# Patient Record
Sex: FEMALE | Race: WHITE | Employment: OTHER | URBAN - METROPOLITAN AREA
[De-identification: names, ages, dates, MRNs, and addresses within clinical notes are randomized per-mention and may not be internally consistent; named-entity substitution may affect disease eponyms.]

---

## 2018-10-23 ENCOUNTER — OFFICE VISIT (OUTPATIENT)
Dept: ENDOCRINOLOGY | Age: 53
End: 2018-10-23
Payer: COMMERCIAL

## 2018-10-23 VITALS
HEART RATE: 100 BPM | RESPIRATION RATE: 16 BRPM | DIASTOLIC BLOOD PRESSURE: 68 MMHG | BODY MASS INDEX: 36.51 KG/M2 | HEIGHT: 67 IN | WEIGHT: 232.6 LBS | SYSTOLIC BLOOD PRESSURE: 122 MMHG | OXYGEN SATURATION: 96 %

## 2018-10-23 DIAGNOSIS — G60.9 IDIOPATHIC PERIPHERAL NEUROPATHY: ICD-10-CM

## 2018-10-23 DIAGNOSIS — E55.9 VITAMIN D DEFICIENCY: ICD-10-CM

## 2018-10-23 DIAGNOSIS — E11.8 TYPE 2 DIABETES MELLITUS WITH COMPLICATION, WITHOUT LONG-TERM CURRENT USE OF INSULIN (HCC): Primary | ICD-10-CM

## 2018-10-23 PROBLEM — E53.8 B12 DEFICIENCY: Status: ACTIVE | Noted: 2018-10-23

## 2018-10-23 PROBLEM — G62.9 PERIPHERAL NEUROPATHY: Status: ACTIVE | Noted: 2018-10-23

## 2018-10-23 LAB — HBA1C MFR BLD: 7 %

## 2018-10-23 PROCEDURE — 99204 OFFICE O/P NEW MOD 45 MIN: CPT | Performed by: INTERNAL MEDICINE

## 2018-10-23 PROCEDURE — 83036 HEMOGLOBIN GLYCOSYLATED A1C: CPT | Performed by: INTERNAL MEDICINE

## 2018-10-23 RX ORDER — ALOGLIPTIN 25 MG/1
25 TABLET, FILM COATED ORAL DAILY
COMMUNITY
End: 2021-05-04 | Stop reason: ALTCHOICE

## 2018-10-23 RX ORDER — ALBUTEROL SULFATE 90 UG/1
2 AEROSOL, METERED RESPIRATORY (INHALATION) EVERY 6 HOURS PRN
COMMUNITY

## 2018-10-23 RX ORDER — GABAPENTIN 800 MG/1
800 TABLET ORAL 3 TIMES DAILY
COMMUNITY
End: 2021-04-28 | Stop reason: SDUPTHER

## 2018-10-23 RX ORDER — HYDROXYZINE PAMOATE 25 MG/1
25 CAPSULE ORAL PRN
COMMUNITY
End: 2021-04-22

## 2018-10-23 NOTE — PROGRESS NOTES
ENDOCRINOLOGY CLINIC NOTE    Date of Service: 10/23/2018    Medical Records Reviewed:   Inpatient records, outpatient records, outside records     Care Team:  Primary Care Physician: No primary care provider on file. .  Provider: Eleno Rodríguez MD  Other provider(s):            Reason for the visit:  Type II DM, peripheral neuropathy, vitD deficiency       Type of Visit:  New visit     History of Present Illness: The history is provided by the patient. No  was used. Accuracy of the patient data is excellent. Isabell Velasquez is a very pleasant 48 y.o. female seen in Endocrine clinic today for diabetes management     Isabell Velasquez was diagnosed with diabetes at 6/2017   Currently on Metformin 1000 mg BID, and Alogliptin 25 mg daily   Recently switched from Januvia to Alogliptin because of insurance coverage.   Pt reported diarrhea with Alogliptin and would like to go back to Januvia  She reported no diarrhea with Metformin   The patient rarely checks blood sugar due to cost of strips   Patient denied any major hypoglycemic episodes  The patient has been mindful of what has been eating and following diabetic diet as encouraged  I reviewed current medications and the patient has no issues with diabetes medications  Last eye exam was 9/2017 and denied any h/o diabetic retinopathy   The patient performs her own foot care  Microvascular complications:  No Retinopathy, Nephropathy + Neuropathy   Macrovascular complications: no CAD, PVD, or Stroke  Refuses Flushot      PAST MEDICAL HISTORY   Past Medical History:   Diagnosis Date    Fibromyalgia     Narcotic abuse ([facility] [address]      PAST SURGICAL HISTORY   Past Surgical History:   Procedure Laterality Date    ABDOMEN SURGERY      BLADDER REPAIR      DENTAL SURGERY      MANDIBLE SURGERY       SOCIAL HISTORY   Social History     Social History    Marital status:      Spouse name: N/A    Number of children: N/A    Years of education: N/A OBJECTIVE    /68 (Site: Right Upper Arm, Position: Sitting, Cuff Size: Medium Adult)   Pulse 100   Resp 16   Ht 5' 7\" (1.702 m)   Wt 232 lb 9.6 oz (105.5 kg)   LMP 06/01/2018   SpO2 96%   BMI 36.43 kg/m²   BP Readings from Last 4 Encounters:   10/23/18 122/68   11/11/16 120/84   09/10/16 124/85     Wt Readings from Last 6 Encounters:   10/23/18 232 lb 9.6 oz (105.5 kg)   11/11/16 225 lb (102.1 kg)   09/10/16 208 lb (94.3 kg)       Physical examination:  General: awake alert, oriented x3, no abnormal position or movements. Obese   HEENT: normocephalic non-traumatic, no exophthalmos   Neck: supple, no LN enlargement, no thyromegaly, no thyroid tenderness, no JVD. Pulm: Clear equal air entry no added sounds, no wheezing or rhonchi    CVS: S1 + S2, no murmur, no heave. Dorsalis pedis pulse palpable   Abd: soft lax, no tenderness, no organomegaly, audible bowel sounds. Skin: warm, no lesions, no rash. No callus, no Ulcers, No acanthosis nigricans   Neuro: CN intact, Monofilament sensation slightly decreased bilateral, muscle power normal  Psych: normal mood, and affect      Review of Laboratory Data:  I have reviewed the following:  No results found for: WBC, RBC, HGB, HCT, MCV, MCH, MCHC, RDW, PLT, MPV, GRANULOCYTES, BANDS   No results found for: NA, K, CO2, BUN, CALCIUM, GFR   No results found for: LABA1C, GLUCOSE, MALBCR, LABMICR, LABCREA  No results found for: CHOL, TRIG, HDL, LDLDIRECT  No results found for: VITD25    All labs medical records and images were reviewed independently     Additional Data Reviewed: Individual visualization of point-of-care blood glucose levels and medications doses    ASSESSMENT & RECOMMENDATIONS   Nita Silva, a 48 y.o.-old female seen in for the following issues     Diabetes Mellitus Type 2    · A1c today 7%  · Because of insurance coverage, she was recently switched from Januvia to Alogliptin.  Pt tolerated Metformin and januvia very well but reported back GI

## 2018-10-23 NOTE — LETTER
Last eye exam was 9/2017 and denied any h/o diabetic retinopathy   The patient performs her own foot care  Microvascular complications:  No Retinopathy, Nephropathy + Neuropathy   Macrovascular complications: no CAD, PVD, or Stroke  Refuses Flushot      PAST MEDICAL HISTORY   Past Medical History:   Diagnosis Date    Fibromyalgia     Narcotic abuse ([facility] [address]      PAST SURGICAL HISTORY   Past Surgical History:   Procedure Laterality Date    ABDOMEN SURGERY      BLADDER REPAIR      DENTAL SURGERY      MANDIBLE SURGERY       SOCIAL HISTORY   Social History     Social History    Marital status:      Spouse name: N/A    Number of children: N/A    Years of education: N/A     Occupational History    Not on file. Social History Main Topics    Smoking status: Current Every Day Smoker     Packs/day: 0.50     Types: Cigarettes    Smokeless tobacco: Never Used      Comment: trying to quit    Alcohol use No    Drug use: No    Sexual activity: Not on file     Other Topics Concern    Not on file     Social History Narrative    No narrative on file     FAMILY HISTORY   No family history on file. ALLERGIES AND DRUG REACTIONS   Allergies   Allergen Reactions    Other      Pt admits that she says she may be allergic to erthyromycin-or zithromycin and caused skin reactions     Sulfa Antibiotics Rash     swelling       CURRENT MEDICATIONS     Current Outpatient Prescriptions   Medication Sig Dispense Refill    metFORMIN (GLUCOPHAGE) 1000 MG tablet Take 1,000 mg by mouth 2 times daily (with meals)      gabapentin (NEURONTIN) 800 MG tablet Take 800 mg by mouth 3 times daily. Romero Rivera alogliptin (NESINA) 25 MG TABS tablet Take 25 mg by mouth daily      albuterol sulfate HFA (VENTOLIN HFA) 108 (90 Base) MCG/ACT inhaler Inhale 2 puffs into the lungs every 6 hours as needed for Wheezing      hydrOXYzine (VISTARIL) 25 MG capsule Take 25 mg by mouth as needed for Itching

## 2018-10-24 ENCOUNTER — TELEPHONE (OUTPATIENT)
Dept: ENDOCRINOLOGY | Age: 53
End: 2018-10-24

## 2018-10-24 RX ORDER — SITAGLIPTIN 100 MG/1
100 TABLET, FILM COATED ORAL DAILY
Qty: 30 TABLET | Refills: 5
Start: 2018-10-24

## 2018-10-24 NOTE — TELEPHONE ENCOUNTER
Janumet is not covered by insurance. They are requiring patient to be on a lower cost medication. The 3 they will approve are Nuris Whelan. Please advise. Thank you.

## 2019-03-09 ENCOUNTER — HOSPITAL ENCOUNTER (EMERGENCY)
Age: 54
Discharge: NURSING, REHAB OR LONG-TERM CARE | End: 2019-03-09
Attending: EMERGENCY MEDICINE
Payer: COMMERCIAL

## 2019-03-09 VITALS
TEMPERATURE: 98 F | BODY MASS INDEX: 30.61 KG/M2 | RESPIRATION RATE: 18 BRPM | SYSTOLIC BLOOD PRESSURE: 112 MMHG | WEIGHT: 195 LBS | DIASTOLIC BLOOD PRESSURE: 70 MMHG | HEIGHT: 67 IN | OXYGEN SATURATION: 100 % | HEART RATE: 90 BPM

## 2019-03-09 DIAGNOSIS — F19.10 DRUG ABUSE (HCC): Primary | ICD-10-CM

## 2019-03-09 DIAGNOSIS — M54.31 SCIATICA OF RIGHT SIDE: ICD-10-CM

## 2019-03-09 DIAGNOSIS — F32.A DEPRESSION, UNSPECIFIED DEPRESSION TYPE: ICD-10-CM

## 2019-03-09 LAB
ACETAMINOPHEN LEVEL: <5 MCG/ML (ref 10–30)
ALBUMIN SERPL-MCNC: 3.7 G/DL (ref 3.5–5.2)
ALP BLD-CCNC: 106 U/L (ref 35–104)
ALT SERPL-CCNC: 17 U/L (ref 0–32)
AMPHETAMINE SCREEN, URINE: NOT DETECTED
ANION GAP SERPL CALCULATED.3IONS-SCNC: 11 MMOL/L (ref 7–16)
AST SERPL-CCNC: 14 U/L (ref 0–31)
BARBITURATE SCREEN URINE: NOT DETECTED
BASOPHILS ABSOLUTE: 0.03 E9/L (ref 0–0.2)
BASOPHILS RELATIVE PERCENT: 0.3 % (ref 0–2)
BENZODIAZEPINE SCREEN, URINE: NOT DETECTED
BILIRUB SERPL-MCNC: 0.3 MG/DL (ref 0–1.2)
BUN BLDV-MCNC: 14 MG/DL (ref 6–20)
CALCIUM SERPL-MCNC: 9.8 MG/DL (ref 8.6–10.2)
CANNABINOID SCREEN URINE: NOT DETECTED
CHLORIDE BLD-SCNC: 97 MMOL/L (ref 98–107)
CO2: 26 MMOL/L (ref 22–29)
COCAINE METABOLITE SCREEN URINE: POSITIVE
CREAT SERPL-MCNC: 0.8 MG/DL (ref 0.5–1)
EOSINOPHILS ABSOLUTE: 0.08 E9/L (ref 0.05–0.5)
EOSINOPHILS RELATIVE PERCENT: 0.7 % (ref 0–6)
ETHANOL: <10 MG/DL (ref 0–0.08)
GFR AFRICAN AMERICAN: >60
GFR NON-AFRICAN AMERICAN: >60 ML/MIN/1.73
GLUCOSE BLD-MCNC: 129 MG/DL (ref 74–99)
HCT VFR BLD CALC: 38.5 % (ref 34–48)
HEMOGLOBIN: 12.4 G/DL (ref 11.5–15.5)
IMMATURE GRANULOCYTES #: 0.04 E9/L
IMMATURE GRANULOCYTES %: 0.4 % (ref 0–5)
LYMPHOCYTES ABSOLUTE: 1.28 E9/L (ref 1.5–4)
LYMPHOCYTES RELATIVE PERCENT: 11.5 % (ref 20–42)
MCH RBC QN AUTO: 27.5 PG (ref 26–35)
MCHC RBC AUTO-ENTMCNC: 32.2 % (ref 32–34.5)
MCV RBC AUTO: 85.4 FL (ref 80–99.9)
METHADONE SCREEN, URINE: NOT DETECTED
MONOCYTES ABSOLUTE: 0.99 E9/L (ref 0.1–0.95)
MONOCYTES RELATIVE PERCENT: 8.9 % (ref 2–12)
NEUTROPHILS ABSOLUTE: 8.73 E9/L (ref 1.8–7.3)
NEUTROPHILS RELATIVE PERCENT: 78.2 % (ref 43–80)
OPIATE SCREEN URINE: POSITIVE
PDW BLD-RTO: 12.2 FL (ref 11.5–15)
PHENCYCLIDINE SCREEN URINE: NOT DETECTED
PLATELET # BLD: 520 E9/L (ref 130–450)
PMV BLD AUTO: 9.5 FL (ref 7–12)
POTASSIUM SERPL-SCNC: 3.9 MMOL/L (ref 3.5–5)
PROPOXYPHENE SCREEN: NOT DETECTED
RBC # BLD: 4.51 E12/L (ref 3.5–5.5)
SALICYLATE, SERUM: <0.3 MG/DL (ref 0–30)
SODIUM BLD-SCNC: 134 MMOL/L (ref 132–146)
TOTAL PROTEIN: 8.2 G/DL (ref 6.4–8.3)
TROPONIN: <0.01 NG/ML (ref 0–0.03)
WBC # BLD: 11.2 E9/L (ref 4.5–11.5)

## 2019-03-09 PROCEDURE — 80307 DRUG TEST PRSMV CHEM ANLYZR: CPT

## 2019-03-09 PROCEDURE — 80053 COMPREHEN METABOLIC PANEL: CPT

## 2019-03-09 PROCEDURE — G0480 DRUG TEST DEF 1-7 CLASSES: HCPCS

## 2019-03-09 PROCEDURE — 6370000000 HC RX 637 (ALT 250 FOR IP): Performed by: EMERGENCY MEDICINE

## 2019-03-09 PROCEDURE — 6360000002 HC RX W HCPCS: Performed by: EMERGENCY MEDICINE

## 2019-03-09 PROCEDURE — 96372 THER/PROPH/DIAG INJ SC/IM: CPT

## 2019-03-09 PROCEDURE — 85025 COMPLETE CBC W/AUTO DIFF WBC: CPT

## 2019-03-09 PROCEDURE — 99285 EMERGENCY DEPT VISIT HI MDM: CPT

## 2019-03-09 PROCEDURE — 93005 ELECTROCARDIOGRAM TRACING: CPT | Performed by: EMERGENCY MEDICINE

## 2019-03-09 PROCEDURE — 2580000003 HC RX 258: Performed by: EMERGENCY MEDICINE

## 2019-03-09 PROCEDURE — 84484 ASSAY OF TROPONIN QUANT: CPT

## 2019-03-09 PROCEDURE — 36415 COLL VENOUS BLD VENIPUNCTURE: CPT

## 2019-03-09 RX ORDER — HYDROCODONE BITARTRATE AND ACETAMINOPHEN 5; 325 MG/1; MG/1
1 TABLET ORAL ONCE
Status: COMPLETED | OUTPATIENT
Start: 2019-03-09 | End: 2019-03-09

## 2019-03-09 RX ORDER — KETOROLAC TROMETHAMINE 30 MG/ML
60 INJECTION, SOLUTION INTRAMUSCULAR; INTRAVENOUS ONCE
Status: COMPLETED | OUTPATIENT
Start: 2019-03-09 | End: 2019-03-09

## 2019-03-09 RX ORDER — 0.9 % SODIUM CHLORIDE 0.9 %
1000 INTRAVENOUS SOLUTION INTRAVENOUS ONCE
Status: COMPLETED | OUTPATIENT
Start: 2019-03-09 | End: 2019-03-09

## 2019-03-09 RX ADMIN — HYDROCODONE BITARTRATE AND ACETAMINOPHEN 1 TABLET: 5; 325 TABLET ORAL at 18:35

## 2019-03-09 RX ADMIN — KETOROLAC TROMETHAMINE 60 MG: 30 INJECTION, SOLUTION INTRAMUSCULAR at 14:18

## 2019-03-09 RX ADMIN — SODIUM CHLORIDE 1000 ML: 9 INJECTION, SOLUTION INTRAVENOUS at 14:19

## 2019-03-09 ASSESSMENT — PAIN SCALES - GENERAL
PAINLEVEL_OUTOF10: 10
PAINLEVEL_OUTOF10: 3
PAINLEVEL_OUTOF10: 6
PAINLEVEL_OUTOF10: 10

## 2019-03-09 ASSESSMENT — ENCOUNTER SYMPTOMS
SINUS PRESSURE: 0
SHORTNESS OF BREATH: 0
BACK PAIN: 1
WHEEZING: 0
VOMITING: 0
SORE THROAT: 0
ABDOMINAL PAIN: 0
DIARRHEA: 0
COUGH: 0
CHEST TIGHTNESS: 0
ABDOMINAL DISTENTION: 0
NAUSEA: 0

## 2019-03-10 LAB
EKG ATRIAL RATE: 102 BPM
EKG P AXIS: 74 DEGREES
EKG P-R INTERVAL: 128 MS
EKG Q-T INTERVAL: 326 MS
EKG QRS DURATION: 76 MS
EKG QTC CALCULATION (BAZETT): 424 MS
EKG R AXIS: 40 DEGREES
EKG T AXIS: 45 DEGREES
EKG VENTRICULAR RATE: 102 BPM

## 2019-03-15 LAB — COCAINE, CONFIRM, URINE: >1000 NG/ML

## 2019-03-20 LAB
6AM URINE: <10 NG/ML
CODEINE, URINE: <20 NG/ML
HYDROCODONE, URINE: <20 NG/ML
HYDROMORPHONE, URINE: <20 NG/ML
MORPHINE URINE: 48 NG/ML
NORHYDROCODONE, URINE: <20 NG/ML
NOROXYCODONE, URINE: <20 NG/ML
NOROXYMORPHONE, URINE: <20 NG/ML
OXYCODONE, URINE CONFIRMATION: <20 NG/ML
OXYMORPHONE, URINE: <20 NG/ML

## 2019-05-10 ENCOUNTER — TELEPHONE (OUTPATIENT)
Dept: ENDOCRINOLOGY | Age: 54
End: 2019-05-10

## 2019-05-10 NOTE — TELEPHONE ENCOUNTER
We received a fax from [employer] that janumet xr 50-1000mg tablet needs prior auth. That is in process in cover my meds.

## 2020-01-30 ENCOUNTER — HOSPITAL ENCOUNTER (EMERGENCY)
Age: 55
Discharge: HOME | End: 2020-01-30
Attending: EMERGENCY MEDICINE
Payer: COMMERCIAL

## 2020-01-30 VITALS
BODY MASS INDEX: 25.9 KG/M2 | TEMPERATURE: 98.4 F | RESPIRATION RATE: 18 BRPM | HEART RATE: 88 BPM | OXYGEN SATURATION: 97 % | WEIGHT: 165 LBS | DIASTOLIC BLOOD PRESSURE: 66 MMHG | SYSTOLIC BLOOD PRESSURE: 96 MMHG | HEIGHT: 67 IN

## 2020-01-30 LAB
INFLUENZA A BY PCR: NOT DETECTED
INFLUENZA B BY PCR: NOT DETECTED

## 2020-01-30 PROCEDURE — 99284 EMERGENCY DEPT VISIT MOD MDM: CPT

## 2020-01-30 PROCEDURE — 6370000000 HC RX 637 (ALT 250 FOR IP): Performed by: EMERGENCY MEDICINE

## 2020-01-30 PROCEDURE — 87502 INFLUENZA DNA AMP PROBE: CPT

## 2020-01-30 RX ORDER — IBUPROFEN 800 MG/1
800 TABLET ORAL ONCE
Status: COMPLETED | OUTPATIENT
Start: 2020-01-30 | End: 2020-01-30

## 2020-01-30 RX ORDER — OSELTAMIVIR PHOSPHATE 75 MG/1
75 CAPSULE ORAL ONCE
Status: COMPLETED | OUTPATIENT
Start: 2020-01-30 | End: 2020-01-30

## 2020-01-30 RX ORDER — OSELTAMIVIR PHOSPHATE 75 MG/1
75 CAPSULE ORAL 2 TIMES DAILY
Qty: 10 CAPSULE | Refills: 0 | Status: SHIPPED | OUTPATIENT
Start: 2020-01-30 | End: 2020-02-04

## 2020-01-30 RX ADMIN — OSELTAMIVIR PHOSPHATE 75 MG: 75 CAPSULE ORAL at 06:35

## 2020-01-30 RX ADMIN — IBUPROFEN 800 MG: 800 TABLET, FILM COATED ORAL at 05:41

## 2020-01-30 ASSESSMENT — ENCOUNTER SYMPTOMS
EYE DISCHARGE: 0
SHORTNESS OF BREATH: 0
COUGH: 0
VOMITING: 0
NAUSEA: 0
SINUS PRESSURE: 0
WHEEZING: 0
ABDOMINAL DISTENTION: 0
DIARRHEA: 0
EYE PAIN: 0
BACK PAIN: 0
EYE REDNESS: 0
SORE THROAT: 0

## 2020-01-30 ASSESSMENT — PAIN DESCRIPTION - FREQUENCY: FREQUENCY: CONTINUOUS

## 2020-01-30 ASSESSMENT — PAIN DESCRIPTION - ORIENTATION: ORIENTATION: RIGHT;LEFT

## 2020-01-30 ASSESSMENT — PAIN SCALES - GENERAL
PAINLEVEL_OUTOF10: 5
PAINLEVEL_OUTOF10: 5

## 2020-01-30 ASSESSMENT — PAIN DESCRIPTION - PAIN TYPE: TYPE: ACUTE PAIN

## 2020-01-30 ASSESSMENT — PAIN DESCRIPTION - LOCATION: LOCATION: HEAD;NECK;KNEE

## 2020-01-30 ASSESSMENT — PAIN DESCRIPTION - DESCRIPTORS: DESCRIPTORS: ACHING

## 2020-01-30 NOTE — ED PROVIDER NOTES
sounds: Normal breath sounds. No stridor. No wheezing, rhonchi or rales. Abdominal:      General: Bowel sounds are normal. There is no distension. Palpations: Abdomen is soft. Tenderness: There is no abdominal tenderness. There is no guarding. Musculoskeletal: Normal range of motion. General: No swelling. Skin:     General: Skin is warm and dry. Findings: No rash. Neurological:      Mental Status: She is alert and oriented to person, place, and time. Procedures     [facility]              --------------------------------------------- PAST HISTORY ---------------------------------------------  Past Medical History:  has a past medical history of COPD (chronic obstructive pulmonary disease) ([facility] Fibromyalgia, Narcotic abuse ([facility] and Type 2 diabetes mellitus with complication, without long-term current use of insulin ([facility] Past Surgical History:  has a past surgical history that includes Mandible surgery; bladder repair; Abdomen surgery; and Dental surgery. Social History:  reports that she has been smoking cigarettes. She has been smoking about 0.50 packs per day. She has never used smokeless tobacco. She reports current drug use. Drug: IV. She reports that she does not drink alcohol. Family History: family history includes Diabetes in her brother, father, maternal uncle, paternal uncle, and another family member; Heart Disease in her maternal uncle and mother. The patients home medications have been reviewed. Allergies:  Other and Sulfa antibiotics    -------------------------------------------------- RESULTS -------------------------------------------------  Labs:  Results for orders placed or performed during the hospital encounter of 01/30/20   Rapid influenza A/B antigens   Result Value Ref Range    Influenza A by PCR Not Detected Not Detected    Influenza B by PCR Not Detected Not Detected       Radiology:  No orders to display       ------------------------- NURSING NOTES AND VITALS REVIEWED ---------------------------  Date / Time Roomed:  1/30/2020  5:01 AM  ED Bed Assignment:  08/08    The nursing notes within the ED encounter and vital signs as below have been reviewed. BP 96/66   Pulse 88   Temp 98.4 °F (36.9 °C) (Oral)   Resp 18   Ht 5' 7\" (1.702 m)   Wt 165 lb (74.8 kg)   LMP 06/01/2018   SpO2 97%   BMI 25.84 kg/m²   Oxygen Saturation Interpretation: Normal      ------------------------------------------ PROGRESS NOTES ------------------------------------------  I have spoken with the patient and discussed todays results, in addition to providing specific details for the plan of care and counseling regarding the diagnosis and prognosis. Their questions are answered at this time and they are agreeable with the plan. I discussed at length with them reasons for immediate return here for re evaluation. They will followup with primary care by calling their office tomorrow. --------------------------------- ADDITIONAL PROVIDER NOTES ---------------------------------  At this time the patient is without objective evidence of an acute process requiring hospitalization or inpatient management. They have remained hemodynamically stable throughout their entire ED visit and are stable for discharge with outpatient follow-up. The plan has been discussed in detail and they are aware of the specific conditions for emergent return, as well as the importance of follow-up. New Prescriptions    OSELTAMIVIR (TAMIFLU) 75 MG CAPSULE    Take 1 capsule by mouth 2 times daily for 5 days       Diagnosis:  1. Exposure to influenza        Disposition:  Patient's disposition: Discharge to home  Patient's condition is stable.              [facility] DO Kati  01/30/20 [id number]

## 2020-11-02 LAB
AVERAGE GLUCOSE: NORMAL
HBA1C MFR BLD: 6 %

## 2021-03-10 ENCOUNTER — OFFICE VISIT (OUTPATIENT)
Dept: ONCOLOGY | Age: 56
End: 2021-03-10
Payer: COMMERCIAL

## 2021-03-10 ENCOUNTER — HOSPITAL ENCOUNTER (OUTPATIENT)
Dept: INFUSION THERAPY | Age: 56
Discharge: HOME | End: 2021-03-10
Payer: COMMERCIAL

## 2021-03-10 VITALS
DIASTOLIC BLOOD PRESSURE: 68 MMHG | BODY MASS INDEX: 28.41 KG/M2 | SYSTOLIC BLOOD PRESSURE: 116 MMHG | TEMPERATURE: 98 F | WEIGHT: 181 LBS | RESPIRATION RATE: 16 BRPM | HEIGHT: 67 IN | HEART RATE: 86 BPM

## 2021-03-10 DIAGNOSIS — R59.1 LYMPHADENOPATHY: Primary | ICD-10-CM

## 2021-03-10 PROCEDURE — 99205 OFFICE O/P NEW HI 60 MIN: CPT | Performed by: INTERNAL MEDICINE

## 2021-03-10 PROCEDURE — 99214 OFFICE O/P EST MOD 30 MIN: CPT

## 2021-03-10 PROCEDURE — 3017F COLORECTAL CA SCREEN DOC REV: CPT | Performed by: INTERNAL MEDICINE

## 2021-03-10 PROCEDURE — G8484 FLU IMMUNIZE NO ADMIN: HCPCS | Performed by: INTERNAL MEDICINE

## 2021-03-10 PROCEDURE — 4004F PT TOBACCO SCREEN RCVD TLK: CPT | Performed by: INTERNAL MEDICINE

## 2021-03-10 PROCEDURE — G8427 DOCREV CUR MEDS BY ELIG CLIN: HCPCS | Performed by: INTERNAL MEDICINE

## 2021-03-10 PROCEDURE — G8419 CALC BMI OUT NRM PARAM NOF/U: HCPCS | Performed by: INTERNAL MEDICINE

## 2021-03-10 RX ORDER — CLONIDINE HYDROCHLORIDE 0.1 MG/1
0.1 TABLET ORAL DAILY
COMMUNITY
End: 2021-05-04 | Stop reason: ALTCHOICE

## 2021-03-10 RX ORDER — TRAZODONE HYDROCHLORIDE 150 MG/1
200 TABLET ORAL NIGHTLY
COMMUNITY

## 2021-03-10 NOTE — PROGRESS NOTES
Claudia Conway Christofer  1965 49 y.o. Referring Physician:     PCP: Celia Evans MD    Vitals:    03/10/21 1502   BP: 116/68   Pulse: 86   Resp: 16   Temp: 98 °F (36.7 °C)        Wt Readings from Last 3 Encounters:   03/10/21 181 lb (82.1 kg)   20 165 lb (74.8 kg)   19 195 lb (88.5 kg)        Body mass index is 28.35 kg/m². Chief Complaint: No chief complaint on file. Cancer Staging  No matching staging information was found for the patient. Prior Radiation Therapy? NO    Concurrent Chemo/radiation? NO    Prior Chemotherapy? NO    Prior Hormonal Therapy? NO    Head and Neck Cancer? No, patient does NOT have HN cancer. LMP: na    Age at first Menses: 17    : 3    Para: 3          Current Outpatient Medications:     cloNIDine (CATAPRES) 0.1 MG tablet, Take 0.1 mg by mouth daily, Disp: , Rfl:     traZODone (DESYREL) 150 MG tablet, Take 150 mg by mouth nightly, Disp: , Rfl:     gabapentin (NEURONTIN) 800 MG tablet, Take 800 mg by mouth 3 times daily. ., Disp: , Rfl:     albuterol sulfate HFA (VENTOLIN HFA) 108 (90 Base) MCG/ACT inhaler, Inhale 2 puffs into the lungs every 6 hours as needed for Wheezing, Disp: , Rfl:     SITagliptin-metFORMIN (JANUMET XR)  MG TB24 per extended release tablet, Take 1 tablet twice a day, Disp: 60 tablet, Rfl: 5    JANUVIA 100 MG tablet, Take 1 tablet by mouth daily (Patient not taking: Reported on 3/10/2021), Disp: 30 tablet, Rfl: 5    metFORMIN (GLUCOPHAGE) 1000 MG tablet, Take 1,000 mg by mouth 2 times daily (with meals), Disp: , Rfl:     alogliptin (NESINA) 25 MG TABS tablet, Take 25 mg by mouth daily, Disp: , Rfl:     hydrOXYzine (VISTARIL) 25 MG capsule, Take 25 mg by mouth as needed for Itching, Disp: , Rfl:        Past Medical History:   Diagnosis Date    COPD (chronic obstructive pulmonary disease) (HCC)     Fibromyalgia     Narcotic abuse (HCC)     Type 2 diabetes mellitus with complication, without long-term current use of insulin ([facility] [address] 10/23/2018       Past Surgical History:   Procedure Laterality Date    ABDOMEN SURGERY      BLADDER REPAIR      DENTAL SURGERY      MANDIBLE SURGERY         Family History   Problem Relation Age of Onset    Heart Disease Mother     Diabetes Father     Diabetes Brother     Diabetes Other     Diabetes Maternal Uncle     Heart Disease Maternal Uncle     Diabetes Paternal Uncle        Social History     Socioeconomic History    Marital status:      Spouse name: Not on file    Number of children: Not on file    Years of education: Not on file    Highest education level: Not on file   Occupational History    Not on file   Social Needs    Financial resource strain: Not on file    Food insecurity     Worry: Not on file     Inability: Not on file    Transportation needs     Medical: Not on file     Non-medical: Not on file   Tobacco Use    Smoking status: Current Every Day Smoker     Packs/day: 0.50     Types: Cigarettes    Smokeless tobacco: Never Used    Tobacco comment: trying to quit   Substance and Sexual Activity    Alcohol use: No    Drug use: Yes     Types: IV     Comment: heroin and crack    Sexual activity: Not on file   Lifestyle    Physical activity     Days per week: Not on file     Minutes per session: Not on file    Stress: Not on file   Relationships    Social connections     Talks on phone: Not on file     Gets together: Not on file     Attends Confucianist service: Not on file     Active member of club or organization: Not on file     Attends meetings of clubs or organizations: Not on file     Relationship status: Not on file    Intimate partner violence     Fear of current or ex partner: Not on file     Emotionally abused: Not on file     Physically abused: Not on file     Forced sexual activity: Not on file   Other Topics Concern    Not on file   Social History Narrative    Not on file           Occupation: housewife  Retired:  NO REVIEW OF SYSTEMS:      Pacemaker/Defibulator/ICD:  No    Mediport: No           FALLS RISK SCREENING ASSESSMENT    Instructions:  Assess the patient and [place] the appropriate indicators that are present for fall risk identification. Total the numbers circled and assign a fall risk score from Table 2.  Reassess patient at a minimum every 12 weeks or with status change. Assessment   Date  3/10/2021     1. Mental Ability: confusion/cognitively impaired Yes - 3, can't remember anything, can't recall names sometimes       2. Elimination Issues: incontinence, frequency No - 0, has bladder sling       3. Ambulatory: use of assistive devices (walker, cane, off-loading devices), attached to equipment (IV pole, oxygen) No - 0     4. Sensory Limitations: dizziness, vertigo, impaired vision No - 0       5. Age Less than 65 years - 0       6. Medication: diuretics, strong analgesics, hypnotics, sedatives, antihypertensive agents   Yes - 3   7. Falls:  recent history of falls within the last 3 months (not to include slipping or tripping)   No - 0   TOTAL 6    If score of 4 or greater was education given? No       TABLE 2   Risk Score Risk Level Plan of Care   0-3 Little or  No Risk 1. Provide assistance as indicated for ambulation activities  2. Reorient confused/cognitively impaired patient  3. Call-light/bell within patient's reach  4. Chair/bed in low position, stretcher/bed with siderails up except when performing patient care activities  5. Educate patient/family/caregiver on falls prevention  6.  Reassess in 12 weeks or with any noted change in patient condition which places them at a risk for a fall   4-6 Moderate Risk 1. Provide assistance as indicated for ambulation activities  2. Reorient confused/cognitively impaired patient  3. Call-light/bell within patient's reach  4. Chair/bed in low position, stretcher/bed with siderails up except when performing patient care activities  5.   Educate

## 2021-03-10 NOTE — PROGRESS NOTES
[address] [facility] [address]  Dept: [phone]  Loc: [phone]  Attending Consult Note      Reason for Visit:   Lymphadenopathy. Referring Physician: Nishant Mendez DO    PCP:  Veronica Peter MD    History of Present Illness: The patient is a pleasant 79-year-old lady, with a past medical history significant for tobacco use disorder, COPD, fibromyalgia, type 2 diabetes mellitus, peripheral neuropathy, asthma, anxiety and depression, who was referred to the hematology office for evaluation of lymphadenopathy. The patient had noticed enlarged lymph nodes in the neck, axilla and groin about 6 to 7 months ago, the lymph node in the left axilla had significantly increased in size, then the size decreased again, she has been feeling tired, no fever, she has night sweats. She had lost weight previously,she is eating more to put the weight back on. She has early satiety. Review of Systems;  CONSTITUTIONAL: No fever, chills. Good appetite, feeling tired. ENMT: Eyes: No diplopia; Nose: No epistaxis. Mouth: No sore throat. RESPIRATORY: No hemoptysis, positive for chronic shortness of breath, cough. CARDIOVASCULAR: No chest pain, palpitations. GASTROINTESTINAL: No nausea/vomiting, she has abdominal tenderness, no diarrhea/constipation. GENITOURINARY: No dysuria, urinary frequency, hematuria. NEURO: No syncope, presyncope, headache.   Remainder:  ROS NEGATIVE    Past Medical History:      Diagnosis Date    COPD (chronic obstructive pulmonary disease) ([address]     Fibromyalgia     Narcotic abuse ([address]     Type 2 diabetes mellitus with complication, without long-term current use of insulin ([address] 10/23/2018     Patient Active Problem List   Diagnosis    Type 2 diabetes mellitus with complication, without long-term current use of insulin ([address]    Vitamin D deficiency    B12 deficiency    Peripheral neuropathy        Past Surgical History: Procedure Laterality Date    ABDOMEN SURGERY      BLADDER REPAIR      DENTAL SURGERY      JOINT REPLACEMENT      tmj surgery    MANDIBLE SURGERY         Family History:  Family History   Problem Relation Age of Onset    Heart Disease Mother     Diabetes Father     Diabetes Brother     Diabetes Other     Diabetes Maternal Uncle     Heart Disease Maternal Uncle     Diabetes Paternal Uncle        Medications:  Reviewed and reconciled.     Social History:  Social History     Socioeconomic History    Marital status:      Spouse name: Not on file    Number of children: Not on file    Years of education: Not on file    Highest education level: Not on file   Occupational History    Not on file   Social Needs    Financial resource strain: Not on file    Food insecurity     Worry: Not on file     Inability: Not on file    Transportation needs     Medical: Not on file     Non-medical: Not on file   Tobacco Use    Smoking status: Current Every Day Smoker     Packs/day: 0.50     Types: Cigarettes    Smokeless tobacco: Never Used    Tobacco comment: trying to quit   Substance and Sexual Activity    Alcohol use: No    Drug use: Yes     Types: IV     Comment: heroin and crack    Sexual activity: Not on file   Lifestyle    Physical activity     Days per week: Not on file     Minutes per session: Not on file    Stress: Not on file   Relationships    Social connections     Talks on phone: Not on file     Gets together: Not on file     Attends Hinduism service: Not on file     Active member of club or organization: Not on file     Attends meetings of clubs or organizations: Not on file     Relationship status: Not on file    Intimate partner violence     Fear of current or ex partner: Not on file     Emotionally abused: Not on file     Physically abused: Not on file     Forced sexual activity: Not on file   Other Topics Concern    Not on file   Social History Narrative    Not on file Allergies: Allergies   Allergen Reactions    Other      Pt admits that she says she may be allergic to erthyromycin-or zithromycin and caused skin reactions     Sulfa Antibiotics Rash     swelling       Physical Exam:  /68 (Site: Right Upper Arm, Position: Sitting, Cuff Size: Large Adult)   Pulse 86   Temp 98 °F (36.7 °C) (Temporal)   Resp 16   Ht 5' 7\" (1.702 m)   Wt 181 lb (82.1 kg)   LMP 06/01/2018   BMI 28.35 kg/m²   GENERAL: Alert, oriented x 3, not in acute distress. HEENT: PERRLA; EOMI. Oropharynx clear. NECK: Supple. Palpable right-sided neck lymphadenopathy. LUNGS: Good air entry bilaterally. No wheezing, crackles or rhonchi. CARDIOVASCULAR: Regular rate. No murmurs, rubs or gallops. ABDOMEN: Soft. Non-tender, non-distended. Positive bowel sounds. EXTREMITIES: Without clubbing, cyanosis, or edema. NEUROLOGIC: No focal deficits. LYMPHATICS: Palpable axillary and inguinal adenopathy. ECOG PS 0    Impression/Plan:      The patient is a pleasant 57-year-old lady, with a past medical history significant for COPD, fibromyalgia, type 2 diabetes mellitus, peripheral neuropathy, asthma, anxiety and depression, who was referred to the hematology office for evaluation of lymphadenopathy. The patient had noticed enlarged lymph nodes in the neck, axilla and groin about 6 to 7 months ago, the lymph node in the left axilla had significantly increased in size, then the size decreased again, she has been feeling tired, no fever, she has night sweats. She had lost weight previously,she is eating more to put the weight back on. She has early satiety. The patient has generalized lymphadenopathy, along with her symptoms this is concerning for a lymphoproliferative disorder, we discussed other possibilities, such as metastatic disease to the lymph nodes, and inflammatory etiology. She will have CBC CMP, LDH, TIGRE, uric acid, ESR, HIV and hepatitis testing done.  CT scans of the neck, chest abdomen pelvis were ordered for further evaluation. RTC in 1-2 weeks. Thank you for allowing us to participate in the care of Mrs. Jerry Chen.     Loren Story MD   HEMATOLOGY/MEDICAL [facility]  [address] [address]  [facility]pt: [facility]: [phone]

## 2021-03-12 ENCOUNTER — TELEPHONE (OUTPATIENT)
Dept: INFUSION THERAPY | Age: 56
End: 2021-03-12

## 2021-03-12 NOTE — TELEPHONE ENCOUNTER
Returned patient's phone call. Patient states she \"has a long list of things that are wrong\" and that she just does not \"feel right. \" Patient c/o jaw tightness, white spots when looking at her phone, headache (which is causing her to [facility] sideways\"), dizziness, no memory recall, trouble with urination. She also states she \"has pain all over. \" Muscles and joints \"feel tight\". Said RN will update Dr. Tom Morfin. Advised patient if she feels that bad or symptoms get worse she needs to go to the ER to be evaluated. Patient verbalized understanding.   Antoinette Coy RN 3/12/2021 [id number]

## 2021-04-02 ENCOUNTER — TELEPHONE (OUTPATIENT)
Dept: NEUROLOGY | Age: 56
End: 2021-04-02

## 2021-04-13 ENCOUNTER — HOSPITAL ENCOUNTER (OUTPATIENT)
Dept: CT IMAGING | Age: 56
Discharge: HOME | End: 2021-04-13
Payer: COMMERCIAL

## 2021-04-13 DIAGNOSIS — R59.1 LYMPHADENOPATHY: ICD-10-CM

## 2021-04-13 PROCEDURE — 70491 CT SOFT TISSUE NECK W/DYE: CPT

## 2021-04-13 PROCEDURE — 71260 CT THORAX DX C+: CPT

## 2021-04-13 PROCEDURE — 6360000004 HC RX CONTRAST MEDICATION: Performed by: RADIOLOGY

## 2021-04-13 PROCEDURE — 74177 CT ABD & PELVIS W/CONTRAST: CPT

## 2021-04-13 RX ADMIN — IOPAMIDOL 75 ML: 755 INJECTION, SOLUTION INTRAVENOUS at 17:03

## 2021-04-13 RX ADMIN — IOHEXOL 50 ML: 240 INJECTION, SOLUTION INTRATHECAL; INTRAVASCULAR; INTRAVENOUS; ORAL at 17:03

## 2021-04-15 ENCOUNTER — HOSPITAL ENCOUNTER (OUTPATIENT)
Dept: INFUSION THERAPY | Age: 56
Discharge: HOME | End: 2021-04-15
Payer: COMMERCIAL

## 2021-04-15 ENCOUNTER — OFFICE VISIT (OUTPATIENT)
Dept: ONCOLOGY | Age: 56
End: 2021-04-15
Payer: COMMERCIAL

## 2021-04-15 VITALS
TEMPERATURE: 97.5 F | RESPIRATION RATE: 18 BRPM | HEIGHT: 67 IN | DIASTOLIC BLOOD PRESSURE: 76 MMHG | OXYGEN SATURATION: 96 % | HEART RATE: 74 BPM | BODY MASS INDEX: 27.44 KG/M2 | SYSTOLIC BLOOD PRESSURE: 112 MMHG | WEIGHT: 174.8 LBS

## 2021-04-15 DIAGNOSIS — R76.8 HEPATITIS C ANTIBODY POSITIVE IN BLOOD: ICD-10-CM

## 2021-04-15 DIAGNOSIS — R59.1 LYMPHADENOPATHY: Primary | ICD-10-CM

## 2021-04-15 DIAGNOSIS — R59.1 LYMPHADENOPATHY: ICD-10-CM

## 2021-04-15 LAB — LACTATE DEHYDROGENASE: 233 U/L (ref 135–214)

## 2021-04-15 PROCEDURE — 99212 OFFICE O/P EST SF 10 MIN: CPT

## 2021-04-15 PROCEDURE — 36415 COLL VENOUS BLD VENIPUNCTURE: CPT

## 2021-04-15 PROCEDURE — 87522 HEPATITIS C REVRS TRNSCRPJ: CPT

## 2021-04-15 PROCEDURE — G8419 CALC BMI OUT NRM PARAM NOF/U: HCPCS | Performed by: INTERNAL MEDICINE

## 2021-04-15 PROCEDURE — 99214 OFFICE O/P EST MOD 30 MIN: CPT | Performed by: INTERNAL MEDICINE

## 2021-04-15 PROCEDURE — 3017F COLORECTAL CA SCREEN DOC REV: CPT | Performed by: INTERNAL MEDICINE

## 2021-04-15 PROCEDURE — 88185 FLOWCYTOMETRY/TC ADD-ON: CPT

## 2021-04-15 PROCEDURE — 88184 FLOWCYTOMETRY/ TC 1 MARKER: CPT

## 2021-04-15 PROCEDURE — 4004F PT TOBACCO SCREEN RCVD TLK: CPT | Performed by: INTERNAL MEDICINE

## 2021-04-15 PROCEDURE — 83615 LACTATE (LD) (LDH) ENZYME: CPT

## 2021-04-15 PROCEDURE — G8427 DOCREV CUR MEDS BY ELIG CLIN: HCPCS | Performed by: INTERNAL MEDICINE

## 2021-04-15 NOTE — PROGRESS NOTES
[address]  Dept: [phone]  Loc: [phone]  Attending progress note      Reason for Visit:   Lymphadenopathy. Referring Physician: Arslan Mcgowan DO    PCP:  Risa Astudillo DO    History of Present Illness: The patient is a pleasant 81-year-old lady, with a past medical history significant for tobacco use disorder, COPD, fibromyalgia, type 2 diabetes mellitus, peripheral neuropathy, asthma, anxiety and depression, who was referred to the hematology office for evaluation of lymphadenopathy. The patient had noticed enlarged lymph nodes in the neck, axilla and groin about 6 to 7 months ago, the lymph node in the left axilla had significantly increased in size, then the size decreased again, she has been feeling tired, no fever, she has night sweats. She had lost weight previously,she is eating more to put the weight back on. She has early satiety. The patient returns for a follow-up visit. Review of Systems;  CONSTITUTIONAL: No fever, chills. Good appetite, feeling tired. ENMT: Eyes: No diplopia; Nose: No epistaxis. Mouth: No sore throat. RESPIRATORY: No hemoptysis, positive for chronic shortness of breath, cough. CARDIOVASCULAR: No chest pain, palpitations. GASTROINTESTINAL: No nausea/vomiting, she has abdominal tenderness, no diarrhea/constipation. GENITOURINARY: No dysuria, urinary frequency, hematuria. NEURO: No syncope, presyncope, headache.   Remainder:  ROS NEGATIVE    Past Medical History:      Diagnosis Date    COPD (chronic obstructive pulmonary disease) ([address]     Fibromyalgia     Narcotic abuse ([address]     Type 2 diabetes mellitus with complication, without long-term current use of insulin ([address] 10/23/2018     Patient Active Problem List   Diagnosis    Type 2 diabetes mellitus with complication, without long-term current use of insulin ([address]    Vitamin D deficiency    B12 deficiency    Peripheral neuropathy        Past Surgical History:      Procedure Laterality Date    ABDOMEN SURGERY      BLADDER REPAIR      DENTAL SURGERY      JOINT REPLACEMENT      tmj surgery    MANDIBLE SURGERY         Family History:  Family History   Problem Relation Age of Onset    Heart Disease Mother     Diabetes Father     Diabetes Brother     Diabetes Other     Diabetes Maternal Uncle     Heart Disease Maternal Uncle     Diabetes Paternal Uncle        Medications:  Reviewed and reconciled.     Social History:  Social History     Socioeconomic History    Marital status:      Spouse name: Not on file    Number of children: Not on file    Years of education: Not on file    Highest education level: Not on file   Occupational History    Not on file   Social Needs    Financial resource strain: Not on file    Food insecurity     Worry: Not on file     Inability: Not on file    Transportation needs     Medical: Not on file     Non-medical: Not on file   Tobacco Use    Smoking status: Current Every Day Smoker     Packs/day: 0.50     Types: Cigarettes    Smokeless tobacco: Never Used    Tobacco comment: trying to quit   Substance and Sexual Activity    Alcohol use: No    Drug use: Yes     Types: IV     Comment: heroin and crack    Sexual activity: Not on file   Lifestyle    Physical activity     Days per week: Not on file     Minutes per session: Not on file    Stress: Not on file   Relationships    Social connections     Talks on phone: Not on file     Gets together: Not on file     Attends Religion service: Not on file     Active member of club or organization: Not on file     Attends meetings of clubs or organizations: Not on file     Relationship status: Not on file    Intimate partner violence     Fear of current or ex partner: Not on file     Emotionally abused: Not on file     Physically abused: Not on file     Forced sexual activity: Not on file   Other Topics Concern    Not on file   Social measuring 2.7 cm, she has splenomegaly, spleen measuring 15.2 cm. LDH was ordered, but has not been done. It will be drawn today. The images and the results of the scans were reviewed with the patient today, we discussed the concern about a lymphoproliferative disorder, peripheral blood flow cytometry was ordered, if it is not consistent with CLL/SLL, an excisional lymph node biopsy will be recommended. Positive hep C antibody, hep C viral load was ordered. RTC in 1 week. Thank you for allowing us to participate in the care of Mrs. Ailyn Zamudio.     Britni Britt MD   [facility]MATOLOGY/MEDICAL [facility]  [address] [address]  Dept: Norma: [phone]

## 2021-04-19 LAB
HCV QNT BY NAAT IU/ML: NOT DETECTED IU/ML
HCV QNT BY NAAT LOG IU/ML: NOT DETECTED LOG IU/ML
INTERPRETATION: NOT DETECTED

## 2021-04-20 LAB
Lab: NORMAL
REPORT: NORMAL
THIS TEST SENT TO: NORMAL

## 2021-04-22 ENCOUNTER — TELEPHONE (OUTPATIENT)
Dept: INFUSION THERAPY | Age: 56
End: 2021-04-22

## 2021-04-22 ENCOUNTER — OFFICE VISIT (OUTPATIENT)
Dept: ONCOLOGY | Age: 56
End: 2021-04-22
Payer: COMMERCIAL

## 2021-04-22 VITALS
WEIGHT: 179.4 LBS | HEIGHT: 67 IN | TEMPERATURE: 97.7 F | DIASTOLIC BLOOD PRESSURE: 67 MMHG | SYSTOLIC BLOOD PRESSURE: 117 MMHG | OXYGEN SATURATION: 99 % | BODY MASS INDEX: 28.16 KG/M2 | HEART RATE: 74 BPM

## 2021-04-22 DIAGNOSIS — R76.8 HEPATITIS C ANTIBODY POSITIVE IN BLOOD: ICD-10-CM

## 2021-04-22 DIAGNOSIS — R59.1 LYMPHADENOPATHY: Primary | ICD-10-CM

## 2021-04-22 PROCEDURE — 99213 OFFICE O/P EST LOW 20 MIN: CPT

## 2021-04-22 PROCEDURE — 99214 OFFICE O/P EST MOD 30 MIN: CPT | Performed by: INTERNAL MEDICINE

## 2021-04-22 PROCEDURE — G8427 DOCREV CUR MEDS BY ELIG CLIN: HCPCS | Performed by: INTERNAL MEDICINE

## 2021-04-22 PROCEDURE — 4004F PT TOBACCO SCREEN RCVD TLK: CPT | Performed by: INTERNAL MEDICINE

## 2021-04-22 PROCEDURE — 3017F COLORECTAL CA SCREEN DOC REV: CPT | Performed by: INTERNAL MEDICINE

## 2021-04-22 PROCEDURE — G8419 CALC BMI OUT NRM PARAM NOF/U: HCPCS | Performed by: INTERNAL MEDICINE

## 2021-04-22 RX ORDER — M-VIT,TX,IRON,MINS/CALC/FOLIC 27MG-0.4MG
1 TABLET ORAL DAILY
COMMUNITY
End: 2022-09-20 | Stop reason: ALTCHOICE

## 2021-04-22 RX ORDER — IBUPROFEN 800 MG/1
800 TABLET ORAL EVERY 6 HOURS PRN
COMMUNITY

## 2021-04-22 RX ORDER — MULTIVIT WITH MINERALS/LUTEIN
250 TABLET ORAL DAILY
COMMUNITY
End: 2022-09-20 | Stop reason: ALTCHOICE

## 2021-04-22 NOTE — PROGRESS NOTES
[address]  Dept: [phone]  Loc: [phone]  Attending progress note      Reason for Visit:   Lymphadenopathy. Referring Physician: Felisa Tidwell DO    PCP:  Nathalia Rosario DO    History of Present Illness: The patient is a pleasant 57-year-old lady, with a past medical history significant for tobacco use disorder, COPD, fibromyalgia, type 2 diabetes mellitus, peripheral neuropathy, asthma, anxiety and depression, who was referred to the hematology office for evaluation of lymphadenopathy. The patient had noticed enlarged lymph nodes in the neck, axilla and groin about 6 to 7 months ago, the lymph node in the left axilla had significantly increased in size, then the size decreased again, no fever, she has night sweats. She had lost weight previously,she is eating more to put the weight back on. [place]  has been feeling tired, she has having back pain and hip pain, she was started on gabapentin by her PCP. Review of Systems;  CONSTITUTIONAL: No fever, chills. Good appetite, feeling tired. ENMT: Eyes: No diplopia; Nose: No epistaxis. Mouth: No sore throat. RESPIRATORY: No hemoptysis, positive for chronic shortness of breath, cough. CARDIOVASCULAR: No chest pain, palpitations. GASTROINTESTINAL: No nausea/vomiting, she has abdominal tenderness, no diarrhea/constipation. GENITOURINARY: No dysuria, urinary frequency, hematuria. NEURO: No syncope, presyncope, headache.   Remainder:  ROS NEGATIVE    Past Medical History:      Diagnosis Date    COPD (chronic obstructive pulmonary disease) ([address]     Fibromyalgia     Narcotic abuse ([address]     Type 2 diabetes mellitus with complication, without long-term current use of insulin ([address] 10/23/2018     Patient Active Problem List   Diagnosis    Type 2 diabetes mellitus with complication, without long-term current use of insulin ([address]    Vitamin D deficiency    B12  Not on file   Social History Narrative    Not on file       Allergies: Allergies   Allergen Reactions    Other      Pt admits that she says she may be allergic to erthyromycin-or zithromycin and caused skin reactions     Sulfa Antibiotics Rash     swelling       Physical Exam:  /67 (Site: Left Upper Arm, Position: Sitting, Cuff Size: Medium Adult)   Pulse 74   Temp 97.7 °F (36.5 °C) (Temporal)   Ht 5' 7\" (1.702 m)   Wt 179 lb 6.4 oz (81.4 kg)   LMP 06/01/2018   SpO2 99%   BMI 28.10 kg/m²   GENERAL: Alert, oriented x 3, not in acute distress. HEENT: PERRLA; EOMI. Oropharynx clear. NECK: Supple. Palpable right-sided neck lymphadenopathy. LUNGS: Good air entry bilaterally. No wheezing, crackles or rhonchi. CARDIOVASCULAR: Regular rate. No murmurs, rubs or gallops. ABDOMEN: Soft. Non-tender, non-distended. Positive bowel sounds. EXTREMITIES: Without clubbing, cyanosis, or edema. NEUROLOGIC: No focal deficits. LYMPHATICS: Palpable axillary and inguinal adenopathy. ECOG PS 0    Impression/Plan:      The patient is a pleasant 79-year-old lady, with a past medical history significant for COPD, fibromyalgia, type 2 diabetes mellitus, peripheral neuropathy, asthma, anxiety and depression, who was referred to the hematology office for evaluation of lymphadenopathy. The patient had noticed enlarged lymph nodes in the neck, axilla and groin about 6 to 7 months ago, the lymph node in the left axilla had significantly increased in size, then the size decreased again, she has been feeling tired, no fever, she has night sweats. She had lost weight previously,she is eating more to put the weight back on. She has early satiety.      The patient has generalized lymphadenopathy, along with her symptoms this is concerning for a lymphoproliferative disorder, CT scan of the neck, chest, abdomen and pelvis were done on 4/13/2021, revealed lymphadenopathy throughout the neck chest abdomen and pelvis, the largest lymph node in the right axilla measuring 4.2 cm, right inguinal lymph node measuring 2.7 cm, she has splenomegaly, spleen measuring 15.2 cm. LDH was ordered, but has not been done. It will be drawn today. The images and the results of the scans were reviewed with the patient today, we discussed the concern about a lymphoproliferative disorder, peripheral blood flow cytometry was done, findings are consistent with a B-cell neoplasm, CD5 negative, CD10 negative, CD19 positive, CD20 positive, CD23 positive, the immunophenotype is nonspecific, could be CD5 negative CLL/SLL, follicular lymphoma, marginal zone lymphoma, or mantle cell lymphoma. Recommended an excisional lymph node biopsy for definitive diagnosis, referral was placed to surgery. Await pathology results. Positive hep C antibody, hep C viral load: not detected, referral was placed to GI.    RTC in about 3 weeks. Thank you for allowing us to participate in the care of Mrs. Jamie Fraser.     Hamilton Nagel MD   HEMATOLOGY/MEDICAL [address]  [address] [address]  Dept: Norma: [phone]

## 2021-04-23 DIAGNOSIS — C83.00 SMALL B-CELL LYMPHOMA, UNSPECIFIED BODY REGION (HCC): ICD-10-CM

## 2021-04-23 DIAGNOSIS — R59.1 LYMPHADENOPATHY: Primary | ICD-10-CM

## 2021-04-26 ENCOUNTER — OFFICE VISIT (OUTPATIENT)
Dept: SURGERY | Age: 56
End: 2021-04-26
Payer: COMMERCIAL

## 2021-04-26 ENCOUNTER — TELEPHONE (OUTPATIENT)
Dept: SURGERY | Age: 56
End: 2021-04-26

## 2021-04-26 VITALS
TEMPERATURE: 98.2 F | HEART RATE: 80 BPM | SYSTOLIC BLOOD PRESSURE: 118 MMHG | BODY MASS INDEX: 28.09 KG/M2 | WEIGHT: 179 LBS | OXYGEN SATURATION: 98 % | DIASTOLIC BLOOD PRESSURE: 81 MMHG | HEIGHT: 67 IN

## 2021-04-26 DIAGNOSIS — R59.0 AXILLARY ADENOPATHY: Primary | ICD-10-CM

## 2021-04-26 PROCEDURE — 3017F COLORECTAL CA SCREEN DOC REV: CPT | Performed by: SURGERY

## 2021-04-26 PROCEDURE — 4004F PT TOBACCO SCREEN RCVD TLK: CPT | Performed by: SURGERY

## 2021-04-26 PROCEDURE — 99204 OFFICE O/P NEW MOD 45 MIN: CPT | Performed by: SURGERY

## 2021-04-26 PROCEDURE — G8419 CALC BMI OUT NRM PARAM NOF/U: HCPCS | Performed by: SURGERY

## 2021-04-26 PROCEDURE — G8427 DOCREV CUR MEDS BY ELIG CLIN: HCPCS | Performed by: SURGERY

## 2021-04-26 NOTE — TELEPHONE ENCOUNTER
Patient provided with surgery instructions during office visit. Patient scheduled for follow up visit with Dr. Richar Bentley on 05/17/2021. Surgery scheduling form faxed and confirmation received.     Electronically signed by Renae Homans, MA on 4/26/2021 at 2:57 PM

## 2021-04-26 NOTE — PROGRESS NOTES
General Surgery History and Physical    Patient's Name/Date of Birth: Brenna Connor / 1965    Date: April 26, 2021     Surgeon: Ronald Red M.D.    PCP: Abdias Richards DO     Chief Complaint: lymphadenopathy    HPI:   Brenna Connor is a 49 y.o. female who presents for evaluation of lymphadenopathy and lymphoma for biopsy excisionally. Past Medical History:   Diagnosis Date    [facility]     Fibromyalgia     [facility]     Type 2 diabetes mellitus with complication, without long-term current use of insulin ([facility] 10/23/2018       Past Surgical History:   Procedure Laterality Date    ABDOMEN SURGERY      BLADDER REPAIR      DENTAL SURGERY      JOINT REPLACEMENT      tmj surgery    MANDIBLE SURGERY         Current Outpatient Medications   Medication Sig Dispense Refill    ibuprofen (ADVIL;MOTRIN) 800 MG tablet Take 800 mg by mouth every 6 hours as needed for Pain      Multiple Vitamins-Minerals (THERAPEUTIC MULTIVITAMIN-MINERALS) tablet Take 1 tablet by mouth daily      Ascorbic Acid (VITAMIN C) 250 MG tablet Take 250 mg by mouth daily      cloNIDine (CATAPRES) 0.1 MG tablet Take 0.1 mg by mouth daily      traZODone (DESYREL) 150 MG tablet Take 150 mg by mouth nightly      JANUVIA 100 MG tablet Take 1 tablet by mouth daily 30 tablet 5    gabapentin (NEURONTIN) 800 MG tablet Take 800 mg by mouth 3 times daily. .      albuterol sulfate HFA (VENTOLIN HFA) 108 (90 Base) MCG/ACT inhaler Inhale 2 puffs into the lungs every 6 hours as needed for Wheezing      metFORMIN (GLUCOPHAGE) 1000 MG tablet Take 1,000 mg by mouth 2 times daily (with meals)      alogliptin (NESINA) 25 MG TABS tablet Take 25 mg by mouth daily       No current facility-administered medications for this visit.         Allergies   Allergen Reactions    Other      Pt admits that she says she may be allergic to erthyromycin-or zithromycin and caused skin reactions     Sulfa Antibiotics Rash     swelling       The patient has a family history that is negative for severe cardiovascular or respiratory issues, negative for reaction to anesthesia.     Social History     Socioeconomic History    Marital status:      Spouse name: Not on file    Number of children: Not on file    Years of education: Not on file    Highest education level: Not on file   Occupational History    Not on file   Social Needs    Financial resource strain: Not on file    Food insecurity     Worry: Not on file     Inability: Not on file    Transportation needs     Medical: Not on file     Non-medical: Not on file   Tobacco Use    Smoking status: Current Every Day Smoker     Packs/day: 0.50     Types: Cigarettes    Smokeless tobacco: Never Used    Tobacco comment: trying to quit   Substance and Sexual Activity    Alcohol use: No    Drug use: Yes     Types: IV     Comment: heroin and crack    Sexual activity: Not on file   Lifestyle    Physical activity     Days per week: Not on file     Minutes per session: Not on file    Stress: Not on file   Relationships    Social connections     Talks on phone: Not on file     Gets together: Not on file     Attends Hindu service: Not on file     Active member of club or organization: Not on file     Attends meetings of clubs or organizations: Not on file     Relationship status: Not on file    Intimate partner violence     Fear of current or ex partner: Not on file     Emotionally abused: Not on file     Physically abused: Not on file     Forced sexual activity: Not on file   Other Topics Concern    Not on file   Social History Narrative    Not on file           Review of Systems  Review of Systems -  General ROS: negative for - chills, fatigue or malaise  ENT ROS: negative for - hearing change, nasal congestion or nasal discharge  Allergy and Immunology ROS: negative for - hives, itchy/watery eyes or nasal congestion  Hematological and Lymphatic ROS: negative for - blood clots, blood transfusions, bruising or fatigue  Endocrine ROS: negative for - malaise/lethargy, mood swings, palpitations or polydipsia/polyuria  Respiratory ROS: negative for - sputum changes, stridor, tachypnea or wheezing  Cardiovascular ROS: negative for - irregular heartbeat, loss of consciousness, murmur or orthopnea  Gastrointestinal ROS: negative for - constipation, diarrhea, gas/bloating, heartburn or hematemesis  Genito-Urinary ROS: negative for -  genital discharge, genital ulcers or hematuria  Musculoskeletal ROS: negative for - gait disturbance, muscle pain or muscular weakness    Physical exam:   /81   Pulse 80   Temp 98.2 °F (36.8 °C) (Temporal)   Ht 5' 7\" (1.702 m)   Wt 179 lb (81.2 kg)   LMP 06/01/2018   SpO2 98%   BMI 28.04 kg/m²   General appearance:  NAD  Head: NCAT, PERRLA, EOMI, red conjunctiva  Neck: supple, no masses  Lungs: CTAB, equal chest rise bilateral  Heart: Reg rate  Abdomen: soft, nontender, nodistended  Skin; no lesions  Gu: no cva tenderness  Extremities: extremities normal, atraumatic, no cyanosis or edema, enlarged right axillary node      Radiology:  CT abdomen/pelvis:   1. Lymphadenopathy is seen throughout the neck, chest, abdomen and pelvis. Findings are concerning for a lymphoma/leukemia. 2. Otherwise, no acute abnormality seen in the neck, chest, abdomen or pelvis. Assessment:  49 y.o. female with lymphadenopathy with lympoma    Plan: To OR for excisional biopsy  Discussed the risk, benefits and alternatives of surgery including wound infections, bleeding, scar  and the risks of  anesthetic including MI, CVA, sudden death or reactions to anesthetic medications. The patient understands the risks and alternatives. All questions were answered to the patient's satisfaction and they freely signed the consent.     Karine Sterling MD  2:28 PM  4/26/2021

## 2021-04-26 NOTE — TELEPHONE ENCOUNTER
Prior Authorization Form:      DEMOGRAPHICS:                     Patient Name:  Migel Panda  Patient :  1965            Insurance:  Payor: Jay Victoria / Plan: Tina Portillo / Product Type: *No Product type* /   Insurance ID Number:    Payor/Plan Subscr  Sex Relation Sub.  Ins. ID Effective Group Num   1. JEROMYSOURCSCOTTIE - * GREGOR CHAVEZ 1965 Female Self [id number] 10/1/18 [facility] [address]         DIAGNOSIS & PROCEDURE:                       Procedure/Operation: excisional biopsy right axillary lymph node           CPT Code: 79258    Diagnosis:  Axillary adenopathy    ICD10 Code: R59.0    Location:  [facility]    Surgeon:  Jana Millan INFORMATION:                          Date: 2021    Time:               Anesthesia:                                                         Status:  Outpatient        Special Comments:           Electronically signed by Jenaro Clement MA on 2021 at 2:56 PM

## 2021-04-28 ENCOUNTER — PREP FOR PROCEDURE (OUTPATIENT)
Dept: BARIATRICS/WEIGHT MGMT | Age: 56
End: 2021-04-28

## 2021-04-28 ENCOUNTER — OFFICE VISIT (OUTPATIENT)
Dept: PALLATIVE CARE | Age: 56
End: 2021-04-28
Payer: COMMERCIAL

## 2021-04-28 VITALS
BODY MASS INDEX: 28.19 KG/M2 | SYSTOLIC BLOOD PRESSURE: 124 MMHG | HEART RATE: 81 BPM | WEIGHT: 180 LBS | DIASTOLIC BLOOD PRESSURE: 77 MMHG

## 2021-04-28 DIAGNOSIS — Z51.5 PALLIATIVE CARE BY SPECIALIST: ICD-10-CM

## 2021-04-28 LAB
AMPHETAMINE SCREEN, URINE: NOT DETECTED
BARBITURATE SCREEN URINE: NOT DETECTED
BENZODIAZEPINE SCREEN, URINE: NOT DETECTED
CANNABINOID SCREEN URINE: NOT DETECTED
COCAINE METABOLITE SCREEN URINE: NOT DETECTED
FENTANYL SCREEN, URINE: NOT DETECTED
Lab: NORMAL
METHADONE SCREEN, URINE: NOT DETECTED
OPIATE SCREEN URINE: NOT DETECTED
OXYCODONE URINE: NOT DETECTED
PHENCYCLIDINE SCREEN URINE: NOT DETECTED

## 2021-04-28 PROCEDURE — 99203 OFFICE O/P NEW LOW 30 MIN: CPT | Performed by: STUDENT IN AN ORGANIZED HEALTH CARE EDUCATION/TRAINING PROGRAM

## 2021-04-28 PROCEDURE — 4004F PT TOBACCO SCREEN RCVD TLK: CPT | Performed by: STUDENT IN AN ORGANIZED HEALTH CARE EDUCATION/TRAINING PROGRAM

## 2021-04-28 PROCEDURE — 3017F COLORECTAL CA SCREEN DOC REV: CPT | Performed by: STUDENT IN AN ORGANIZED HEALTH CARE EDUCATION/TRAINING PROGRAM

## 2021-04-28 PROCEDURE — 99204 OFFICE O/P NEW MOD 45 MIN: CPT | Performed by: STUDENT IN AN ORGANIZED HEALTH CARE EDUCATION/TRAINING PROGRAM

## 2021-04-28 PROCEDURE — G8427 DOCREV CUR MEDS BY ELIG CLIN: HCPCS | Performed by: STUDENT IN AN ORGANIZED HEALTH CARE EDUCATION/TRAINING PROGRAM

## 2021-04-28 PROCEDURE — G8419 CALC BMI OUT NRM PARAM NOF/U: HCPCS | Performed by: STUDENT IN AN ORGANIZED HEALTH CARE EDUCATION/TRAINING PROGRAM

## 2021-04-28 RX ORDER — SODIUM CHLORIDE 0.9 % (FLUSH) 0.9 %
10 SYRINGE (ML) INJECTION PRN
Status: CANCELLED | OUTPATIENT
Start: 2021-04-28

## 2021-04-28 RX ORDER — DULOXETIN HYDROCHLORIDE 30 MG/1
30 CAPSULE, DELAYED RELEASE ORAL DAILY
Qty: 30 CAPSULE | Refills: 0 | Status: SHIPPED
Start: 2021-04-28 | End: 2021-05-04 | Stop reason: ALTCHOICE

## 2021-04-28 RX ORDER — SODIUM CHLORIDE 0.9 % (FLUSH) 0.9 %
10 SYRINGE (ML) INJECTION EVERY 12 HOURS SCHEDULED
Status: CANCELLED | OUTPATIENT
Start: 2021-04-28

## 2021-04-28 RX ORDER — SODIUM CHLORIDE 9 MG/ML
25 INJECTION, SOLUTION INTRAVENOUS PRN
Status: CANCELLED | OUTPATIENT
Start: 2021-04-28

## 2021-04-28 RX ORDER — GABAPENTIN 800 MG/1
800 TABLET ORAL 3 TIMES DAILY
Qty: 90 TABLET | Refills: 0 | Status: SHIPPED | OUTPATIENT
Start: 2021-04-28 | End: 2021-05-28

## 2021-04-28 NOTE — PROGRESS NOTES
Department of Palliative Medicine  Ambulatory Note  Provider: Merlin Colbert MD      Location of service:[facility]  Chief Complaint: Tamiko Hodgson is a 49 y.o. female with chief complaint of pain    Assessment/Plan      Lymphadenopathy  Following Dr. Selvin Schmidt to, scheduled for biopsy     Anxiety/Depression  Taking Trazodone 150 mg nightly  Add Cymbalta 30 mg daily, can increase to 60 mg daily  Follow UDS    Fibromyalgia  Taking gabapentin 600 mg 3 times daily, increase to 800 mg 3 times daily  Continue alternating ibuprofen and acetaminophen for pain    Follow Up:  2 weeks. Encouraged to call with any questions, concerns, needs, or changes in symptoms. Subjective:       Tamiko Hodgson is a 49 y.o. female with significant past medical history of fibromyalgia, diabetes mellitus type 2, anxiety, depression, and lymphadenopathy who was referred to [address] [place] [address]. Patient presents with  and is tearful during exam. She states she has generalized lymphadenopathy, and is scheduled for a biopsy with Dr. Jeffery Govea. Patient states she has been suffering from pain in the lower back and hips for the past year, but the pain has increased over the past month. She rates the pain a 9 on a scale of 1-10 in her lower back. The pain is shooting and throbbing, that radiates into her hips and legs. The pain is aggravated with movement. She states nothing relieves the pain, she has been alternating ibuprofen and acetaminophen without relief. Will increase gabapentin to 800 mg 3 times daily and add Cymbalta 30 mg daily. The patient also complains of anxiety, depression, night sweats, and trouble sleeping. She was started on trazodone, which has not relieved her symptoms. Starting Cymbalta and can be increased to 60 mg daily. Explained that the medication can take 4 to 6 weeks to take effect. Patient requesting something be prescribed in the interim to help her sleep.   Educated that we would 04/26/21 179 lb (81.2 kg)   04/22/21 179 lb 6.4 oz (81.4 kg)     /77   Pulse 81   Wt 180 lb (81.6 kg)   LMP 06/01/2018   BMI 28.19 kg/m²     Gen:  Alert, appears stated age, well nourished, in no acute distress  HEENT:  Normocephalic, no drainage,  Neck:  Supple  Lungs:  non labored breathing  Heart[de-identified]  Regular rate  Abd:  Soft,   M/S/Ext:  moving all extremities   Skin:  no visible lesions  Neuro:  Alert, oriented x 3; following commands      [place] Symptom Assessment Score   [place] Score 4/28/2021   Pain Score 9   Tiredness Score 9   Nausea Score Not nauseated   Depression Score 7   Anxiety Score 7   Drowsiness Score 7   Appetite Score 4   Wellbeing Score 8   Dyspnea Score 4   Total Assessment Score(calculated) 55       Assessed by: patient. Current Medications:  Medications reviewed: yes    Controlled Substances Monitoring: [place] reviewed 4/28/21. No flowsheet data found. Brittany Brandt MD  Palliative Care Department     Time/Communication  Greater than 50% of time spent, total 50 minutes in face-to-face counseling and coordination of care regarding symptom management. Note: This report was completed using computer[employer]arm[employer]oton[employer] voiced recognition software. Every effort has been made to ensure accuracy; however, inadvertent computerized transcription errors may be present.

## 2021-04-29 ENCOUNTER — HOSPITAL ENCOUNTER (OUTPATIENT)
Age: 56
Discharge: HOME | End: 2021-05-01
Payer: COMMERCIAL

## 2021-04-29 DIAGNOSIS — Z01.818 PREOP TESTING: ICD-10-CM

## 2021-04-29 PROCEDURE — U0005 INFEC AGEN DETEC AMPLI PROBE: HCPCS

## 2021-04-29 PROCEDURE — U0003 INFECTIOUS AGENT DETECTION BY NUCLEIC ACID (DNA OR RNA); SEVERE ACUTE RESPIRATORY SYNDROME CORONAVIRUS 2 (SARS-COV-2) (CORONAVIRUS DISEASE [COVID-19]), AMPLIFIED PROBE TECHNIQUE, MAKING USE OF HIGH THROUGHPUT TECHNOLOGIES AS DESCRIBED BY CMS-2020-01-R: HCPCS

## 2021-05-01 LAB
SARS-COV-2: NOT DETECTED
SOURCE: NORMAL

## 2021-05-04 LAB
6AM URINE: <10 NG/ML
CODEINE, URINE: <20 NG/ML
HYDROCODONE, URINE: <20 NG/ML
HYDROMORPHONE, URINE: <20 NG/ML
MORPHINE URINE: <20 NG/ML
NORHYDROCODONE, URINE: <20 NG/ML
NOROXYCODONE, URINE: <20 NG/ML
NOROXYMORPHONE, URINE: <20 NG/ML
OXYCODONE, URINE CONFIRMATION: <20 NG/ML
OXYMORPHONE, URINE: <20 NG/ML

## 2021-05-04 RX ORDER — METHOCARBAMOL 750 MG/1
750 TABLET, FILM COATED ORAL 4 TIMES DAILY
COMMUNITY
End: 2022-09-20 | Stop reason: ALTCHOICE

## 2021-05-04 RX ORDER — ACETAMINOPHEN 325 MG/1
650 TABLET ORAL PRN
COMMUNITY

## 2021-05-04 NOTE — PROGRESS NOTES
[facility]                                                                                                                    PRE OP INSTRUCTIONS FOR  Alana         Date: 5/4/2021    Date of surgery: 5/5/2021    Arrival Time: Hospital will call you between 5pm and 7pm with your final arrival time for surgery    1. Do not eat or drink anything after  Midnight  prior to surgery. This includes no water, chewing gum, mints or ice chips. 2. Take the following medications with a small sip of water on the morning of Surgery: bring diabetic meds use inhaler if needed and bring   Take gabapentin  dos with sip water  Bring diabetic meds    3. Diabetics may take evening dose of insulin but none after midnight. If you feel symptomatic or low blood sugar morning of surgery drink 1-2 ounces of apple juice only. 4. Aspirin, Ibuprofen, Advil, Naproxen, Vitamin E and other Anti-inflammatory products should be stopped  before surgery  as directed by your physician. Take Tylenol only unless instructed otherwise by your surgeon. 5. Check with your Doctor regarding stopping Plavix, Coumadin, Lovenox, Eliquis, Effient, or other blood thinners. 6. Do not smoke,use illicit drugs and do not drink any alcoholic beverages 24 hours prior to surgery. 7. You may brush your teeth the morning of surgery. DO NOT SWALLOW WATER    8. You MUST make arrangements for a responsible adult to take you home after your surgery. You will not be allowed to leave alone or drive yourself home. It is strongly suggested someone stay with you the first 24 hrs. Your surgery will be cancelled if you do not have a ride home. 9. PEDIATRIC PATIENTS ONLY:  A parent/legal guardian must accompany a child scheduled for surgery and plan to stay at the hospital until the child is discharged. Please do not bring other children with you.     10. Please wear simple, loose fitting clothing to the hospital.  Do not bring valuables (money, credit cards, checkbooks, etc.) Do not wear any makeup (including no eye makeup) or nail polish on your fingers or toes. 11. DO NOT wear any jewelry or piercings on day of surgery. All body piercing jewelry must be removed. 12. Shower the night before surgery with __x_Antibacterial soap /GABRIELA WIPES________    13. TOTAL JOINT REPLACEMENT/HYSTERECTOMY PATIENTS ONLY---Remember to bring Blood Bank bracelet to the hospital on the day of surgery. 14. If you have a Living Will and Durable Power of  for Healthcare, please bring in a copy. 15. If appropriate bring crutches, inspirex, WALKER, CANE etc... 12. Notify your Surgeon if you develop any illness between now and surgery time, cough, cold, fever, sore throat, nausea, vomiting, etc.  Please notify your surgeon if you experience dizziness, shortness of breath or blurred vision between now & the time of your surgery. 17. If you have ___dentures, they will be removed before going to the OR; we will provide you a container. If you wear ___contact lenses or ___glasses, they will be removed; please bring a case for them. 18. To provide excellent care visitors will be limited to 2 in the room at any given time. 19. Please bring picture ID and insurance card. 20. Sleep apnea patients need to bring CPAP AND SETTINGS to hospital on day of surgery. 21. During flu season no children under the age of 15 are permitted in the hospital for the safety of all patients. 22. Other               Please call AMBULATORY CARE if you have any further questions.    [address] [facility]     [address]

## 2021-05-05 ENCOUNTER — ANESTHESIA EVENT (OUTPATIENT)
Dept: OPERATING ROOM | Age: 56
End: 2021-05-05
Payer: COMMERCIAL

## 2021-05-05 ENCOUNTER — HOSPITAL ENCOUNTER (OUTPATIENT)
Age: 56
Setting detail: OUTPATIENT SURGERY
Discharge: HOME | End: 2021-05-05
Attending: SURGERY | Admitting: SURGERY
Payer: COMMERCIAL

## 2021-05-05 ENCOUNTER — PREP FOR PROCEDURE (OUTPATIENT)
Dept: SURGERY | Age: 56
End: 2021-05-05

## 2021-05-05 VITALS
HEART RATE: 89 BPM | WEIGHT: 181 LBS | RESPIRATION RATE: 18 BRPM | OXYGEN SATURATION: 96 % | TEMPERATURE: 97.8 F | HEIGHT: 67 IN | DIASTOLIC BLOOD PRESSURE: 61 MMHG | BODY MASS INDEX: 28.41 KG/M2 | SYSTOLIC BLOOD PRESSURE: 130 MMHG

## 2021-05-05 DIAGNOSIS — Z01.818 PREOP TESTING: Primary | ICD-10-CM

## 2021-05-05 LAB
AMPHETAMINE SCREEN, URINE: POSITIVE
ANION GAP SERPL CALCULATED.3IONS-SCNC: 9 MMOL/L (ref 7–16)
BARBITURATE SCREEN URINE: NOT DETECTED
BENZODIAZEPINE SCREEN, URINE: NOT DETECTED
BUN BLDV-MCNC: 22 MG/DL (ref 6–20)
CALCIUM SERPL-MCNC: 8.7 MG/DL (ref 8.6–10.2)
CANNABINOID SCREEN URINE: NOT DETECTED
CHLORIDE BLD-SCNC: 104 MMOL/L (ref 98–107)
CO2: 25 MMOL/L (ref 22–29)
COCAINE METABOLITE SCREEN URINE: POSITIVE
CREAT SERPL-MCNC: 1 MG/DL (ref 0.5–1)
EKG ATRIAL RATE: 74 BPM
EKG P AXIS: 70 DEGREES
EKG P-R INTERVAL: 152 MS
EKG Q-T INTERVAL: 390 MS
EKG QRS DURATION: 80 MS
EKG QTC CALCULATION (BAZETT): 432 MS
EKG R AXIS: 10 DEGREES
EKG T AXIS: 40 DEGREES
EKG VENTRICULAR RATE: 74 BPM
FENTANYL SCREEN, URINE: NOT DETECTED
GFR AFRICAN AMERICAN: >60
GFR NON-AFRICAN AMERICAN: 58 ML/MIN/1.73
GLUCOSE BLD-MCNC: 88 MG/DL (ref 74–99)
HCT VFR BLD CALC: 36.9 % (ref 34–48)
HEMOGLOBIN: 11.7 G/DL (ref 11.5–15.5)
Lab: ABNORMAL
MCH RBC QN AUTO: 27.2 PG (ref 26–35)
MCHC RBC AUTO-ENTMCNC: 31.7 % (ref 32–34.5)
MCV RBC AUTO: 85.8 FL (ref 80–99.9)
METER GLUCOSE: 93 MG/DL (ref 74–99)
METHADONE SCREEN, URINE: NOT DETECTED
OPIATE SCREEN URINE: NOT DETECTED
OXYCODONE URINE: NOT DETECTED
PDW BLD-RTO: 13.8 FL (ref 11.5–15)
PHENCYCLIDINE SCREEN URINE: NOT DETECTED
PLATELET # BLD: 157 E9/L (ref 130–450)
PMV BLD AUTO: 9.4 FL (ref 7–12)
POTASSIUM REFLEX MAGNESIUM: 4 MMOL/L (ref 3.5–5)
RBC # BLD: 4.3 E12/L (ref 3.5–5.5)
SODIUM BLD-SCNC: 138 MMOL/L (ref 132–146)
WBC # BLD: 4.2 E9/L (ref 4.5–11.5)

## 2021-05-05 PROCEDURE — 85027 COMPLETE CBC AUTOMATED: CPT

## 2021-05-05 PROCEDURE — 82962 GLUCOSE BLOOD TEST: CPT

## 2021-05-05 PROCEDURE — 80307 DRUG TEST PRSMV CHEM ANLYZR: CPT

## 2021-05-05 PROCEDURE — 93005 ELECTROCARDIOGRAM TRACING: CPT | Performed by: ANESTHESIOLOGY

## 2021-05-05 PROCEDURE — 88185 FLOWCYTOMETRY/TC ADD-ON: CPT

## 2021-05-05 PROCEDURE — 99024 POSTOP FOLLOW-UP VISIT: CPT | Performed by: SURGERY

## 2021-05-05 PROCEDURE — 88184 FLOWCYTOMETRY/ TC 1 MARKER: CPT

## 2021-05-05 PROCEDURE — 80048 BASIC METABOLIC PNL TOTAL CA: CPT

## 2021-05-05 PROCEDURE — 2580000003 HC RX 258: Performed by: ANESTHESIOLOGY

## 2021-05-05 PROCEDURE — 36415 COLL VENOUS BLD VENIPUNCTURE: CPT

## 2021-05-05 RX ORDER — SODIUM CHLORIDE 0.9 % (FLUSH) 0.9 %
10 SYRINGE (ML) INJECTION EVERY 12 HOURS SCHEDULED
Status: DISCONTINUED | OUTPATIENT
Start: 2021-05-05 | End: 2021-05-05 | Stop reason: HOSPADM

## 2021-05-05 RX ORDER — SODIUM CHLORIDE 9 MG/ML
INJECTION, SOLUTION INTRAVENOUS CONTINUOUS
Status: DISCONTINUED | OUTPATIENT
Start: 2021-05-05 | End: 2021-05-05 | Stop reason: HOSPADM

## 2021-05-05 RX ORDER — CEFAZOLIN SODIUM 2 G/50ML
2000 SOLUTION INTRAVENOUS
Status: DISCONTINUED | OUTPATIENT
Start: 2021-05-05 | End: 2021-05-05 | Stop reason: HOSPADM

## 2021-05-05 RX ORDER — SODIUM CHLORIDE 9 MG/ML
25 INJECTION, SOLUTION INTRAVENOUS PRN
Status: DISCONTINUED | OUTPATIENT
Start: 2021-05-05 | End: 2021-05-05 | Stop reason: HOSPADM

## 2021-05-05 RX ORDER — SODIUM CHLORIDE 0.9 % (FLUSH) 0.9 %
10 SYRINGE (ML) INJECTION PRN
Status: DISCONTINUED | OUTPATIENT
Start: 2021-05-05 | End: 2021-05-05 | Stop reason: HOSPADM

## 2021-05-05 RX ORDER — SODIUM CHLORIDE 0.9 % (FLUSH) 0.9 %
10 SYRINGE (ML) INJECTION PRN
Status: CANCELLED | OUTPATIENT
Start: 2021-05-05

## 2021-05-05 RX ORDER — SODIUM CHLORIDE 0.9 % (FLUSH) 0.9 %
10 SYRINGE (ML) INJECTION EVERY 12 HOURS SCHEDULED
Status: CANCELLED | OUTPATIENT
Start: 2021-05-05

## 2021-05-05 RX ORDER — SODIUM CHLORIDE 9 MG/ML
25 INJECTION, SOLUTION INTRAVENOUS PRN
Status: CANCELLED | OUTPATIENT
Start: 2021-05-05

## 2021-05-05 RX ADMIN — SODIUM CHLORIDE: 9 INJECTION, SOLUTION INTRAVENOUS at 06:04

## 2021-05-05 ASSESSMENT — PAIN DESCRIPTION - DESCRIPTORS: DESCRIPTORS: ACHING;DISCOMFORT;SORE

## 2021-05-05 ASSESSMENT — PAIN - FUNCTIONAL ASSESSMENT: PAIN_FUNCTIONAL_ASSESSMENT: 0-10

## 2021-05-05 NOTE — H&P
General Surgery History and Physical     Patient's Name/Date of Birth: Cas Deleon / 1965     Date: 5/5/21      Surgeon: Abdulaziz Steven M.D.     PCP: Cassidy Mercado DO     Chief Complaint: lymphadenopathy     HPI:   Cas Deleon is a 49 y.o. female who presents for evaluation of lymphadenopathy and lymphoma for biopsy excisionally.      Past Medical History        Past Medical History:   Diagnosis Date    COPD [facility]ch[facility] obstructive [facility] disease) ([facility]      Fibromyalgia      Narcotic abuse ([facility]      Type 2 diabetes mellitus with complication, without long-term current use of insulin ([facility] 10/23/2018            Past Surgical History         Past Surgical History:   Procedure Laterality Date    ABDOMEN SURGERY        BLADDER REPAIR        DENTAL SURGERY        JOINT REPLACEMENT         tmj surgery    MANDIBLE SURGERY                Current Facility-Administered Medications          Current Outpatient Medications   Medication Sig Dispense Refill    ibuprofen (ADVIL;MOTRIN) 800 MG tablet Take 800 mg by mouth every 6 hours as needed for Pain        Multiple Vitamins-Minerals (THERAPEUTIC MULTIVITAMIN-MINERALS) tablet Take 1 tablet by mouth daily        Ascorbic Acid (VITAMIN C) 250 MG tablet Take 250 mg by mouth daily        cloNIDine (CATAPRES) 0.1 MG tablet Take 0.1 mg by mouth daily        traZODone (DESYREL) 150 MG tablet Take 150 mg by mouth nightly        JANUVIA 100 MG tablet Take 1 tablet by mouth daily 30 tablet 5    gabapentin (NEURONTIN) 800 MG tablet Take 800 mg by mouth 3 times daily. .        albuterol sulfate HFA (VENTOLIN HFA) 108 (90 Base) MCG/ACT inhaler Inhale 2 puffs into the lungs every 6 hours as needed for Wheezing        metFORMIN (GLUCOPHAGE) 1000 MG tablet Take 1,000 mg by mouth 2 times daily (with meals)        alogliptin (NESINA) 25 MG TABS tablet Take 25 mg by mouth daily          No current facility-administered medications for this visit.            Allergies   Allergen Reactions    Other         Pt admits that she says she may be allergic to erthyromycin-or zithromycin and caused skin reactions     Sulfa Antibiotics Rash       swelling         The patient has a family history that is negative for severe cardiovascular or respiratory issues, negative for reaction to anesthesia.     Social History               Socioeconomic History    Marital status:        Spouse name: Not on file    Number of children: Not on file    Years of education: Not on file    Highest education level: Not on file   Occupational History    Not on file   Social Needs    Financial resource strain: Not on file    Food insecurity       Worry: Not on file       Inability: Not on file    Transportation needs       Medical: Not on file       Non-medical: Not on file   Tobacco Use    Smoking status: Current Every Day Smoker       Packs/day: 0.50       Types: Cigarettes    Smokeless tobacco: Never Used    Tobacco comment: trying to quit   Substance and Sexual Activity    Alcohol use: No    Drug use:  Yes       Types: IV       Comment: heroin and crack    Sexual activity: Not on file   Lifestyle    Physical activity       Days per week: Not on file       Minutes per session: Not on file    Stress: Not on file   Relationships    Social connections       Talks on phone: Not on file       Gets together: Not on file       Attends Shinto service: Not on file       Active member of club or organization: Not on file       Attends meetings of clubs or organizations: Not on file       Relationship status: Not on file    Intimate partner violence       Fear of current or ex partner: Not on file       Emotionally abused: Not on file       Physically abused: Not on file       Forced sexual activity: Not on file   Other Topics Concern    Not on file   Social History Narrative    Not on file                  Review of Systems  Review of Systems -  General ROS: negative for -

## 2021-05-06 ENCOUNTER — ANESTHESIA (OUTPATIENT)
Dept: OPERATING ROOM | Age: 56
End: 2021-05-06
Payer: COMMERCIAL

## 2021-05-06 ENCOUNTER — HOSPITAL ENCOUNTER (OUTPATIENT)
Age: 56
Setting detail: OUTPATIENT SURGERY
Discharge: HOME | End: 2021-05-06
Attending: SURGERY | Admitting: SURGERY
Payer: COMMERCIAL

## 2021-05-06 VITALS
DIASTOLIC BLOOD PRESSURE: 77 MMHG | TEMPERATURE: 97 F | WEIGHT: 181 LBS | HEIGHT: 67 IN | OXYGEN SATURATION: 98 % | HEART RATE: 78 BPM | RESPIRATION RATE: 18 BRPM | SYSTOLIC BLOOD PRESSURE: 133 MMHG | BODY MASS INDEX: 28.41 KG/M2

## 2021-05-06 VITALS
RESPIRATION RATE: 16 BRPM | DIASTOLIC BLOOD PRESSURE: 62 MMHG | OXYGEN SATURATION: 96 % | SYSTOLIC BLOOD PRESSURE: 95 MMHG

## 2021-05-06 DIAGNOSIS — R59.1 LYMPHADENOPATHY: Primary | ICD-10-CM

## 2021-05-06 LAB — METER GLUCOSE: 96 MG/DL (ref 74–99)

## 2021-05-06 PROCEDURE — 2709999900 HC NON-CHARGEABLE SUPPLY: Performed by: SURGERY

## 2021-05-06 PROCEDURE — 88305 TISSUE EXAM BY PATHOLOGIST: CPT

## 2021-05-06 PROCEDURE — 3700000001 HC ADD 15 MINUTES (ANESTHESIA): Performed by: SURGERY

## 2021-05-06 PROCEDURE — 88341 IMHCHEM/IMCYTCHM EA ADD ANTB: CPT

## 2021-05-06 PROCEDURE — 3600000002 HC SURGERY LEVEL 2 BASE: Performed by: SURGERY

## 2021-05-06 PROCEDURE — 88342 IMHCHEM/IMCYTCHM 1ST ANTB: CPT

## 2021-05-06 PROCEDURE — 6360000002 HC RX W HCPCS: Performed by: SURGERY

## 2021-05-06 PROCEDURE — 7100000010 HC PHASE II RECOVERY - FIRST 15 MIN: Performed by: SURGERY

## 2021-05-06 PROCEDURE — 82962 GLUCOSE BLOOD TEST: CPT

## 2021-05-06 PROCEDURE — 2580000003 HC RX 258: Performed by: ANESTHESIOLOGY

## 2021-05-06 PROCEDURE — 2500000003 HC RX 250 WO HCPCS: Performed by: SURGERY

## 2021-05-06 PROCEDURE — 3700000000 HC ANESTHESIA ATTENDED CARE: Performed by: SURGERY

## 2021-05-06 PROCEDURE — 3600000012 HC SURGERY LEVEL 2 ADDTL 15MIN: Performed by: SURGERY

## 2021-05-06 PROCEDURE — 38525 BIOPSY/REMOVAL LYMPH NODES: CPT | Performed by: SURGERY

## 2021-05-06 PROCEDURE — 7100000011 HC PHASE II RECOVERY - ADDTL 15 MIN: Performed by: SURGERY

## 2021-05-06 PROCEDURE — 6360000002 HC RX W HCPCS: Performed by: NURSE ANESTHETIST, CERTIFIED REGISTERED

## 2021-05-06 PROCEDURE — 6370000000 HC RX 637 (ALT 250 FOR IP): Performed by: ANESTHESIOLOGY

## 2021-05-06 RX ORDER — BUPIVACAINE HYDROCHLORIDE AND EPINEPHRINE 2.5; 5 MG/ML; UG/ML
INJECTION, SOLUTION EPIDURAL; INFILTRATION; INTRACAUDAL; PERINEURAL PRN
Status: DISCONTINUED | OUTPATIENT
Start: 2021-05-06 | End: 2021-05-06 | Stop reason: ALTCHOICE

## 2021-05-06 RX ORDER — CEFAZOLIN SODIUM 2 G/50ML
2000 SOLUTION INTRAVENOUS
Status: COMPLETED | OUTPATIENT
Start: 2021-05-06 | End: 2021-05-06

## 2021-05-06 RX ORDER — SODIUM CHLORIDE 0.9 % (FLUSH) 0.9 %
10 SYRINGE (ML) INJECTION EVERY 12 HOURS SCHEDULED
Status: DISCONTINUED | OUTPATIENT
Start: 2021-05-06 | End: 2021-05-06 | Stop reason: HOSPADM

## 2021-05-06 RX ORDER — SODIUM CHLORIDE 9 MG/ML
25 INJECTION, SOLUTION INTRAVENOUS PRN
Status: DISCONTINUED | OUTPATIENT
Start: 2021-05-06 | End: 2021-05-06 | Stop reason: HOSPADM

## 2021-05-06 RX ORDER — FENTANYL CITRATE 50 UG/ML
INJECTION, SOLUTION INTRAMUSCULAR; INTRAVENOUS PRN
Status: DISCONTINUED | OUTPATIENT
Start: 2021-05-06 | End: 2021-05-06 | Stop reason: SDUPTHER

## 2021-05-06 RX ORDER — PROPOFOL 10 MG/ML
INJECTION, EMULSION INTRAVENOUS CONTINUOUS PRN
Status: DISCONTINUED | OUTPATIENT
Start: 2021-05-06 | End: 2021-05-06 | Stop reason: SDUPTHER

## 2021-05-06 RX ORDER — SODIUM CHLORIDE 9 MG/ML
INJECTION, SOLUTION INTRAVENOUS CONTINUOUS
Status: DISCONTINUED | OUTPATIENT
Start: 2021-05-06 | End: 2021-05-06 | Stop reason: HOSPADM

## 2021-05-06 RX ORDER — SODIUM CHLORIDE 0.9 % (FLUSH) 0.9 %
10 SYRINGE (ML) INJECTION PRN
Status: DISCONTINUED | OUTPATIENT
Start: 2021-05-06 | End: 2021-05-06 | Stop reason: HOSPADM

## 2021-05-06 RX ORDER — HYDROCODONE BITARTRATE AND ACETAMINOPHEN 5; 325 MG/1; MG/1
1 TABLET ORAL EVERY 6 HOURS PRN
Qty: 6 TABLET | Refills: 0 | Status: SHIPPED | OUTPATIENT
Start: 2021-05-06 | End: 2021-05-09

## 2021-05-06 RX ORDER — MIDAZOLAM HYDROCHLORIDE 1 MG/ML
INJECTION INTRAMUSCULAR; INTRAVENOUS PRN
Status: DISCONTINUED | OUTPATIENT
Start: 2021-05-06 | End: 2021-05-06 | Stop reason: SDUPTHER

## 2021-05-06 RX ORDER — HYDROCODONE BITARTRATE AND ACETAMINOPHEN 5; 325 MG/1; MG/1
1 TABLET ORAL ONCE
Status: COMPLETED | OUTPATIENT
Start: 2021-05-06 | End: 2021-05-06

## 2021-05-06 RX ORDER — MORPHINE SULFATE 2 MG/ML
2 INJECTION, SOLUTION INTRAMUSCULAR; INTRAVENOUS ONCE
Status: DISCONTINUED | OUTPATIENT
Start: 2021-05-06 | End: 2021-05-06 | Stop reason: HOSPADM

## 2021-05-06 RX ADMIN — PROPOFOL INJECTABLE EMULSION 150 MCG/KG/MIN: 10 INJECTION, EMULSION INTRAVENOUS at 11:54

## 2021-05-06 RX ADMIN — HYDROCODONE BITARTRATE AND ACETAMINOPHEN 1 TABLET: 5; 325 TABLET ORAL at 13:11

## 2021-05-06 RX ADMIN — SODIUM CHLORIDE: 9 INJECTION, SOLUTION INTRAVENOUS at 11:51

## 2021-05-06 RX ADMIN — FENTANYL CITRATE 50 MCG: 50 INJECTION, SOLUTION INTRAMUSCULAR; INTRAVENOUS at 12:14

## 2021-05-06 RX ADMIN — CEFAZOLIN SODIUM 2000 MG: 2 SOLUTION INTRAVENOUS at 11:51

## 2021-05-06 RX ADMIN — FENTANYL CITRATE 50 MCG: 50 INJECTION, SOLUTION INTRAMUSCULAR; INTRAVENOUS at 12:01

## 2021-05-06 RX ADMIN — MIDAZOLAM 2 MG: 1 INJECTION INTRAMUSCULAR; INTRAVENOUS at 11:51

## 2021-05-06 RX ADMIN — SODIUM CHLORIDE: 9 INJECTION, SOLUTION INTRAVENOUS at 10:28

## 2021-05-06 RX ADMIN — FENTANYL CITRATE 50 MCG: 50 INJECTION, SOLUTION INTRAMUSCULAR; INTRAVENOUS at 11:54

## 2021-05-06 ASSESSMENT — PULMONARY FUNCTION TESTS
PIF_VALUE: 1
PIF_VALUE: 0
PIF_VALUE: 1
PIF_VALUE: 0
PIF_VALUE: 1
PIF_VALUE: 0
PIF_VALUE: 1
PIF_VALUE: 0
PIF_VALUE: 1
PIF_VALUE: 1
PIF_VALUE: 0
PIF_VALUE: 1
PIF_VALUE: 0
PIF_VALUE: 1

## 2021-05-06 ASSESSMENT — LIFESTYLE VARIABLES: SMOKING_STATUS: 1

## 2021-05-06 NOTE — H&P
General Surgery History and Physical     Patient's Name/Date of Birth: Valeria Romano / 1965     Date: 5/5/21      Surgeon: Madison Celestin MD     PCP: Harriet Mccoy DO     Chief Complaint: lymphadenopathy     HPI:   Valeria Romano is a 49 y.o. female who presents for evaluation of lymphadenopathy and lymphoma for biopsy excisionally.      Past Medical History        Past Medical History:   Diagnosis Date    COPD (chronic obstructive pulmonary disease) ([facility]      Fibromyalgia      Narcotic abuse ([facility]      Type 2 diabetes mellitus with complication, without long-term current use of insulin ([facility] 10/23/2018            Past Surgical History         Past Surgical History:   Procedure Laterality Date    ABDOMEN SURGERY        BLADDER REPAIR        DENTAL SURGERY        JOINT REPLACEMENT         tmj surgery    MANDIBLE SURGERY                Current Facility-Administered Medications          Current Outpatient Medications   Medication Sig Dispense Refill    ibuprofen (ADVIL;MOTRIN) 800 MG tablet Take 800 mg by mouth every 6 hours as needed for Pain        Multiple Vitamins-Minerals (THERAPEUTIC MULTIVITAMIN-MINERALS) tablet Take 1 tablet by mouth daily        Ascorbic Acid (VITAMIN C) 250 MG tablet Take 250 mg by mouth daily        cloNIDine (CATAPRES) 0.1 MG tablet Take 0.1 mg by mouth daily        traZODone (DESYREL) 150 MG tablet Take 150 mg by mouth nightly        JANUVIA 100 MG tablet Take 1 tablet by mouth daily 30 tablet 5    gabapentin (NEURONTIN) 800 MG tablet Take 800 mg by mouth 3 times daily. .        albuterol sulfate HFA (VENTOLIN HFA) 108 (90 Base) MCG/ACT inhaler Inhale 2 puffs into the lungs every 6 hours as needed for Wheezing        metFORMIN (GLUCOPHAGE) 1000 MG tablet Take 1,000 mg by mouth 2 times daily (with meals)        alogliptin (NESINA) 25 MG TABS tablet Take 25 mg by mouth daily          No current facility-administered medications for this visit.            chills, fatigue or malaise  ENT ROS: negative for - hearing change, nasal congestion or nasal discharge  Allergy and Immunology ROS: negative for - hives, itchy/watery eyes or nasal congestion  Hematological and Lymphatic ROS: negative for - blood clots, blood transfusions, bruising or fatigue  Endocrine ROS: negative for - malaise/lethargy, mood swings, palpitations or polydipsia/polyuria  Respiratory ROS: negative for - sputum changes, stridor, tachypnea or wheezing  Cardiovascular ROS: negative for - irregular heartbeat, loss of consciousness, murmur or orthopnea  Gastrointestinal ROS: negative for - constipation, diarrhea, gas/bloating, heartburn or hematemesis  Genito-Urinary ROS: negative for -  genital discharge, genital ulcers or hematuria  Musculoskeletal ROS: negative for - gait disturbance, muscle pain or muscular weakness     Physical exam:   BP (!) 140/74   Pulse 80   Temp 97.7 °F (36.5 °C) (Temporal)   Resp 18   Ht 5' 7\" (1.702 m)   Wt 181 lb (82.1 kg)   LMP 06/01/2018   SpO2 98%   BMI 28.35 kg/m²     General appearance:  NAD  Head: NCAT, PERRLA, EOMI, red conjunctiva  Neck: supple, no masses  Lungs: CTAB, equal chest rise bilateral  Heart: Reg rate  Abdomen: soft, nontender, nodistended  Skin; no lesions  Gu: no cva tenderness  Extremities: extremities normal, atraumatic, no cyanosis or edema, enlarged right axillary node        Radiology:  CT abdomen/pelvis:   1. Lymphadenopathy is seen throughout the neck, chest, abdomen and pelvis. Findings are concerning for a lymphoma/leukemia. 2. Otherwise, no acute abnormality seen in the neck, chest, abdomen or pelvis.            Assessment:  49 y.o. female with lymphadenopathy with lympoma     Plan: To OR for excisional biopsy  Discussed the risk, benefits and alternatives of surgery including wound infections, bleeding, scar  and the risks of  anesthetic including MI, CVA, sudden death or reactions to anesthetic medications.  The patient understands the risks and alternatives.  All questions were answered to the patient's satisfaction and they freely signed the consent.  Akil Patino MD

## 2021-05-06 NOTE — ANESTHESIA POSTPROCEDURE EVALUATION
Department of Anesthesiology  Postprocedure Note    Patient: Abdoul Santiago  MRN: [id number]  YOB: 1965  Date of evaluation: 5/6/2021  Time:  1:25 PM     Procedure Summary     Date: 05/06/21 Room / Location: [address] [place] [address] 03 / [facility]    Anesthesia Start: 6358 Anesthesia Stop: 1382    Procedure: EXCISIONAL BIOPSY RIGHT AXILLARY LYMPH NODE (Right ) Diagnosis: (AXILLARY ADENOPATHY)    Surgeons: Heather Varela MD Responsible Provider: Calos Muro MD    Anesthesia Type: MAC ASA Status: 2          Anesthesia Type: MAC    Kaylyn Phase I: Kaylyn Score: 10    Kaylyn Phase II:      Last vitals: Reviewed and per EMR flowsheets.        Anesthesia Post Evaluation    Patient location during evaluation: PACU  Patient participation: complete - patient participated  Level of consciousness: awake  Pain score: 2  Airway patency: patent  Nausea & Vomiting: no nausea  Complications: no  Cardiovascular status: blood pressure returned to baseline  Respiratory status: acceptable  Hydration status: euvolemic

## 2021-05-06 NOTE — OP NOTE
[facility]    SURGEON: Raza Torrez MD    ASSISTANT: none    PREOPERATIVE DIAGNOSES: Right axilla enlarged lymph node    POSTOPERATIVE DIAGNOSES: Same    OPERATION: Excision of right axilla lymph node    ANESTHESIA: LMAC    ESTIMATED BLOOD LOSS: 10 ml    COMPLICATIONS: None. SPECIMENS: R axilla lymph node    FINDINGS: enlarged lymph node    HISTORY:   Valeriy Blackwell is a 49 y.o. female who presents with lymphadenopathy. she presents for excisional biopsy. The risks benefits and alternatives of the procedure were discussed with the patient who stated understanding and agreed to proceed. DESCRIPTION OF PROCEDURE: The patient was brought to the operating room and positioned supine on the OR table. Sequential compression devices were placed on the patient's lower extremities and functioning. Preoperative antibiotics were administered, 2 g ancef. MAC anesthesia was obtained without complication as per the anesthesia record. Immediately prior to the procedure a time-out was called and the surgical checklist was reviewed and agreed upon by all present. The patient was prepped with chloraprep and draped in the usual sterile fashion and the procedure went forth with strict aseptic technique under maximal barrier precautions. The patient was noted to have a large palpable lymph node in the right axilla.  groin. An incision was made with 15 blade in the hairline over what felt to be a hardened lymph node, and carried through dermis. Using electrocautery, dissection was carefully carried through subcutaneous tissue until the gonzalo capsule was visualized. All surround lymphatic vessels were carefully and systematically ligated. . The node was excised and sent for pathology. The wound was then copiously irrigated. Excellent hemostasis was obtained. The incision was closed with 3-0 vicryl suture in layers in interrupted fashion. 3-0 vicryl running subcuticular suture was used to closed the skin. The skin was cleaned and dried and Dermaflex was applied. DISPOSITION: The patient was awakened from anesthesia. and was transferred to the PACU in good condition. Discharge to home is expected following appropriate post-anesthesia recovery in the PACU.       Electronically signed by Jayda Sinclair MD.

## 2021-05-13 LAB
Lab: NORMAL
REPORT: NORMAL
THIS TEST SENT TO: NORMAL

## 2021-05-26 ENCOUNTER — OFFICE VISIT (OUTPATIENT)
Dept: ONCOLOGY | Age: 56
End: 2021-05-26
Payer: COMMERCIAL

## 2021-05-26 ENCOUNTER — HOSPITAL ENCOUNTER (OUTPATIENT)
Dept: INFUSION THERAPY | Age: 56
Discharge: HOME | End: 2021-05-26
Payer: COMMERCIAL

## 2021-05-26 VITALS
HEART RATE: 69 BPM | SYSTOLIC BLOOD PRESSURE: 126 MMHG | HEIGHT: 67 IN | OXYGEN SATURATION: 97 % | BODY MASS INDEX: 28.82 KG/M2 | DIASTOLIC BLOOD PRESSURE: 74 MMHG | WEIGHT: 183.6 LBS | TEMPERATURE: 98.7 F | RESPIRATION RATE: 18 BRPM

## 2021-05-26 DIAGNOSIS — C83.00 SMALL B-CELL LYMPHOMA, UNSPECIFIED BODY REGION (HCC): Primary | ICD-10-CM

## 2021-05-26 DIAGNOSIS — R52 PAIN: ICD-10-CM

## 2021-05-26 DIAGNOSIS — C83.00 SMALL B-CELL LYMPHOMA, UNSPECIFIED BODY REGION (HCC): ICD-10-CM

## 2021-05-26 DIAGNOSIS — C82.04 GRADE 1 FOLLICULAR LYMPHOMA OF LYMPH NODES OF AXILLA (HCC): Primary | ICD-10-CM

## 2021-05-26 PROCEDURE — 3017F COLORECTAL CA SCREEN DOC REV: CPT | Performed by: INTERNAL MEDICINE

## 2021-05-26 PROCEDURE — G8419 CALC BMI OUT NRM PARAM NOF/U: HCPCS | Performed by: INTERNAL MEDICINE

## 2021-05-26 PROCEDURE — 99213 OFFICE O/P EST LOW 20 MIN: CPT

## 2021-05-26 PROCEDURE — G8427 DOCREV CUR MEDS BY ELIG CLIN: HCPCS | Performed by: INTERNAL MEDICINE

## 2021-05-26 PROCEDURE — 4004F PT TOBACCO SCREEN RCVD TLK: CPT | Performed by: INTERNAL MEDICINE

## 2021-05-26 PROCEDURE — 99214 OFFICE O/P EST MOD 30 MIN: CPT | Performed by: INTERNAL MEDICINE

## 2021-05-26 NOTE — PROGRESS NOTES
[address]  Dept: [phone]  Loc: [phone]  Attending progress note      Reason for Visit:   Lymphadenopathy. Referring Physician: Marino Thompson DO    PCP:  Ruben Goodson MD    History of Present Illness: The patient is a pleasant 62-year-old lady, with a past medical history significant for tobacco use disorder, COPD, fibromyalgia, type 2 diabetes mellitus, peripheral neuropathy, asthma, anxiety and depression, who was referred to the hematology office for evaluation of lymphadenopathy. The patient had noticed enlarged lymph nodes in the neck, axilla and groin about 6 to 7 months ago, the lymph node in the left axilla had significantly increased in size, then the size decreased again, no fever, she has night sweats. She had lost weight previously,she is eating more to put the weight back on. Ivette Dalal  has been feeling tired, she has having back pain and hip pain, she was started on gabapentin by her PCP. Review of Systems;  CONSTITUTIONAL: No fever, chills. Good appetite, feeling tired. ENMT: Eyes: No diplopia; Nose: No epistaxis. Mouth: No sore throat. RESPIRATORY: No hemoptysis, positive for chronic shortness of breath, cough. CARDIOVASCULAR: No chest pain, palpitations. GASTROINTESTINAL: No nausea/vomiting, she has abdominal tenderness, no diarrhea/constipation. GENITOURINARY: No dysuria, urinary frequency, hematuria. NEURO: No syncope, presyncope, headache.   Remainder:  ROS NEGATIVE    Past Medical History:      Diagnosis Date    Asthma     Cancer ([facility]     non hodgkin lymphoma    COPD (chronic obstructive pulmonary disease) ([facility])     Fibromyalgia     Narcotic abuse ([facility] [address]     Type 2 diabetes mellitus with complication, without long-term current use of insulin ([facility] [address] 10/23/2018     Patient Active Problem List   Diagnosis    Type 2 diabetes mellitus with complication, without long-term current use of insulin ([facility] [address] [facility]    Vitamin D deficiency    B12 deficiency    Peripheral neuropathy        Past Surgical History:      Procedure Laterality Date    ABDOMEN SURGERY      AXILLARY SURGERY Right 5/6/2021    EXCISIONAL BIOPSY RIGHT AXILLARY LYMPH NODE performed by Tess Akins MD at [address]      vaginal ap and surgery bladder sling    MANDIBLE SURGERY      tmj surgery    WISDOM TOOTH EXTRACTION         Family History:  Family History   Problem Relation Age of Onset    Heart Disease Mother     Diabetes Father     Diabetes Brother     Diabetes Other     Diabetes Maternal Uncle     Heart Disease Maternal Uncle     Diabetes Paternal Uncle        Medications:  Reviewed and reconciled. Social History:  Social History     Socioeconomic History    Marital status:      Spouse name: Not on file    Number of children: Not on file    Years of education: Not on file    Highest education level: Not on file   Occupational History    Not on file   Tobacco Use    Smoking status: Current Every Day Smoker     Packs/day: 0.50     Types: Cigarettes    Smokeless tobacco: Never Used    Tobacco comment: trying to quit   Vaping Use    Vaping Use: Never used   Substance and Sexual Activity    Alcohol use: No    Drug use: Yes     Types: IV     Comment: heroin and crack last 3months    Sexual activity: Not on file   Other Topics Concern    Not on file   Social History Narrative    Not on file     Social Determinants of Health     Financial Resource Strain:     Difficulty of Paying Living Expenses:    Food Insecurity:     Worried About Running Out of Food in the Last Year:     Ran Out of Food in the Last Year:    Transportation Needs:     Lack of Transportation (Medical):      Lack of Transportation (Non-Medical):    Physical Activity:     Days of Exercise per Week:     Minutes of Exercise per Session:    Stress:     Feeling of Stress :    Social Connections:     Frequency of Communication with Friends and Family:     Frequency of Social Gatherings with Friends and Family:     Attends Latter-day Services:     Active Member of Clubs or Organizations:     Attends Club or Organization Meetings:     Marital Status:    Intimate Partner Violence:     Fear of Current or Ex-Partner:     Emotionally Abused:     Physically Abused:     Sexually Abused: Allergies: Allergies   Allergen Reactions    Other      Pt admits that she says she may be allergic to erthyromycin-or zithromycin and caused skin reactions     Sulfa Antibiotics Rash     swelling       Physical Exam:  /74   Pulse 69   Temp 98.7 °F (37.1 °C)   Resp 18   Ht 5' 7\" (1.702 m)   Wt 183 lb 9.6 oz (83.3 kg)   LMP 06/01/2018   SpO2 97%   BMI 28.76 kg/m²   GENERAL: Alert, oriented x 3, not in acute distress. HEENT: PERRLA; EOMI. Oropharynx clear. NECK: Supple. Palpable right-sided neck lymphadenopathy. LUNGS: Good air entry bilaterally. No wheezing, crackles or rhonchi. CARDIOVASCULAR: Regular rate. No murmurs, rubs or gallops. ABDOMEN: Soft. Non-tender, non-distended. Positive bowel sounds. EXTREMITIES: Without clubbing, cyanosis, or edema. NEUROLOGIC: No focal deficits. LYMPHATICS: Palpable axillary and inguinal adenopathy. The surgical site in the right axilla is healing nicely. ECOG PS 0    Impression/Plan:      The patient is a pleasant 79-year-old lady, with a past medical history significant for COPD, fibromyalgia, type 2 diabetes mellitus, peripheral neuropathy, asthma, anxiety and depression, who was referred to the hematology office for evaluation of lymphadenopathy. The patient had noticed enlarged lymph nodes in the neck, axilla and groin about 6 to 7 months ago, the lymph node in the left axilla had significantly increased in size, then the size decreased again, she has been feeling tired, no fever, she has night sweats.   She had lost weight previously,she is eating more to put the weight back on. She has early satiety. The patient has generalized lymphadenopathy, along with her symptoms this is concerning for a lymphoproliferative disorder, CT scan of the neck, chest, abdomen and pelvis were done on 4/13/2021, revealed lymphadenopathy throughout the neck chest abdomen and pelvis, the largest lymph node in the right axilla measuring 4.2 cm, right inguinal lymph node measuring 2.7 cm, she has splenomegaly, spleen measuring 15.2 cm. LDH was ordered, but has not been done. It will be drawn today. The images and the results of the scans were reviewed with the patient today, we discussed the concern about a lymphoproliferative disorder, peripheral blood flow cytometry was done, findings are consistent with a B-cell neoplasm, CD5 negative, CD10 negative, CD19 positive, CD20 positive, CD23 positive, the immunophenotype is nonspecific, could be CD5 negative CLL/SLL, follicular lymphoma, marginal zone lymphoma, or mantle cell lymphoma. Recommended an excisional lymph node biopsy for definitive diagnosis, referral was placed to surgery. The patient had an excisional biopsy of the right axillary lymph node on 5/6/2021, pathology was consistent with follicular lymphoma, grade 1, follicular type. The pathology results were reviewed with the patient, discussed with her her diagnosis with follicular lymphoma, she has stage IIIB disease, recommended a PET scan for staging, the patient has symptoms related to the lymphoma, including fatigue, night sweats, and had weight loss, I discussed with her treatment with Rituxan bendamustine, described in the side effects from the patient, she will think about the treatment. Back and hip pain, PCP had recommended gabapentin, referral was placed to pain management. Positive hep C antibody, hep C viral load: not detected, referral was placed to GI, she was called but did not schedule appointment yet. RTC in about 3 weeks.     Thank you for allowing us to participate in the care of Mrs. Jackson Smoker.     Akbar Gutiérrez MD   HEMATOLOGY/MEDICAL [facility]  [address] [address]  Dept: [facility]: [phone]

## 2021-05-27 ENCOUNTER — TELEPHONE (OUTPATIENT)
Dept: INFUSION THERAPY | Age: 56
End: 2021-05-27

## 2021-05-27 NOTE — TELEPHONE ENCOUNTER
Began prior authorization process through [employer]) for PET scan. Faxed orders and clinicals to:  [phone]. Once authorization is received, will schedule scans and contact patient with appointment information.

## 2021-05-28 ENCOUNTER — TELEPHONE (OUTPATIENT)
Dept: INFUSION THERAPY | Age: 56
End: 2021-05-28

## 2021-05-28 NOTE — TELEPHONE ENCOUNTER
Faxed referral, physician progress note and clinicals to:  Suresh Molina MD to schedule patient referral appointment.

## 2021-06-04 ENCOUNTER — CLINICAL DOCUMENTATION (OUTPATIENT)
Dept: INFUSION THERAPY | Age: 56
End: 2021-06-04

## 2021-06-04 ENCOUNTER — TELEPHONE (OUTPATIENT)
Dept: INFUSION THERAPY | Age: 56
End: 2021-06-04

## 2021-06-04 NOTE — PROGRESS NOTES
Patient called into the office complaining of pain in the right upper quadrant, rib pain and she thinks her spleen is enlarged. She was very short of breath when talking on the phone. I called Dr. Lindsey Quiles. Kenneth and she wanted the patient to go to the ER. I called Madhuri back with these instructions, however she says she is no one at home to take her to the ER. She will call her  at work and see if he can come home to take her. I told her if she becomes more short of breath and has more difficulty breathing,  to call 911.

## 2021-06-04 NOTE — TELEPHONE ENCOUNTER
Called patient and left a message to phone me back for PET scan appointment information. Patient is to arrive on 6/11/2021 at [facility] [address] (registration) at 4:30 pm.  Patient will then report to radiology afterward. Patient is to follow PET scan instructions - need to verify that patient has these instructions. Will await return phone call.

## 2021-06-09 ENCOUNTER — TELEPHONE (OUTPATIENT)
Dept: ONCOLOGY | Age: 56
End: 2021-06-09

## 2021-06-14 ENCOUNTER — HOSPITAL ENCOUNTER (EMERGENCY)
Age: 56
Discharge: PSYCHIATRIC OR SUBSTANCE USE FACILITY | End: 2021-06-15
Attending: EMERGENCY MEDICINE
Payer: COMMERCIAL

## 2021-06-14 DIAGNOSIS — R45.851 SUICIDAL IDEATION: Primary | ICD-10-CM

## 2021-06-14 LAB
ACETAMINOPHEN LEVEL: <5 MCG/ML (ref 10–30)
ALBUMIN SERPL-MCNC: 4.2 G/DL (ref 3.5–5.2)
ALP BLD-CCNC: 123 U/L (ref 35–104)
ALT SERPL-CCNC: 10 U/L (ref 0–32)
AMPHETAMINE SCREEN, URINE: NOT DETECTED
ANION GAP SERPL CALCULATED.3IONS-SCNC: 12 MMOL/L (ref 7–16)
AST SERPL-CCNC: 18 U/L (ref 0–31)
BARBITURATE SCREEN URINE: NOT DETECTED
BASOPHILS ABSOLUTE: 0.02 E9/L (ref 0–0.2)
BASOPHILS RELATIVE PERCENT: 0.4 % (ref 0–2)
BENZODIAZEPINE SCREEN, URINE: NOT DETECTED
BILIRUB SERPL-MCNC: 0.3 MG/DL (ref 0–1.2)
BUN BLDV-MCNC: 11 MG/DL (ref 6–20)
CALCIUM SERPL-MCNC: 9.5 MG/DL (ref 8.6–10.2)
CANNABINOID SCREEN URINE: NOT DETECTED
CHLORIDE BLD-SCNC: 105 MMOL/L (ref 98–107)
CO2: 23 MMOL/L (ref 22–29)
COCAINE METABOLITE SCREEN URINE: POSITIVE
CREAT SERPL-MCNC: 1 MG/DL (ref 0.5–1)
EOSINOPHILS ABSOLUTE: 0.04 E9/L (ref 0.05–0.5)
EOSINOPHILS RELATIVE PERCENT: 0.8 % (ref 0–6)
ETHANOL: <10 MG/DL (ref 0–0.08)
FENTANYL SCREEN, URINE: POSITIVE
GFR AFRICAN AMERICAN: >60
GFR NON-AFRICAN AMERICAN: 58 ML/MIN/1.73
GLUCOSE BLD-MCNC: 136 MG/DL (ref 74–99)
HCG(URINE) PREGNANCY TEST: NEGATIVE
HCG, URINE, POC: NEGATIVE
HCT VFR BLD CALC: 42.1 % (ref 34–48)
HEMOGLOBIN: 13.9 G/DL (ref 11.5–15.5)
IMMATURE GRANULOCYTES #: 0.01 E9/L
IMMATURE GRANULOCYTES %: 0.2 % (ref 0–5)
LYMPHOCYTES ABSOLUTE: 1.12 E9/L (ref 1.5–4)
LYMPHOCYTES RELATIVE PERCENT: 22.8 % (ref 20–42)
Lab: ABNORMAL
Lab: NORMAL
MCH RBC QN AUTO: 28.1 PG (ref 26–35)
MCHC RBC AUTO-ENTMCNC: 33 % (ref 32–34.5)
MCV RBC AUTO: 85.2 FL (ref 80–99.9)
METHADONE SCREEN, URINE: NOT DETECTED
MONOCYTES ABSOLUTE: 0.32 E9/L (ref 0.1–0.95)
MONOCYTES RELATIVE PERCENT: 6.5 % (ref 2–12)
NEGATIVE QC PASS/FAIL: NORMAL
NEUTROPHILS ABSOLUTE: 3.41 E9/L (ref 1.8–7.3)
NEUTROPHILS RELATIVE PERCENT: 69.3 % (ref 43–80)
OPIATE SCREEN URINE: NOT DETECTED
OXYCODONE URINE: NOT DETECTED
PDW BLD-RTO: 13.7 FL (ref 11.5–15)
PHENCYCLIDINE SCREEN URINE: NOT DETECTED
PLATELET # BLD: 191 E9/L (ref 130–450)
PMV BLD AUTO: 9.4 FL (ref 7–12)
POSITIVE QC PASS/FAIL: NORMAL
POTASSIUM SERPL-SCNC: 4.1 MMOL/L (ref 3.5–5)
RBC # BLD: 4.94 E12/L (ref 3.5–5.5)
SALICYLATE, SERUM: <0.3 MG/DL (ref 0–30)
SARS-COV-2, NAAT: NOT DETECTED
SODIUM BLD-SCNC: 140 MMOL/L (ref 132–146)
TOTAL PROTEIN: 7.2 G/DL (ref 6.4–8.3)
TRICYCLIC ANTIDEPRESSANTS SCREEN SERUM: NEGATIVE NG/ML
WBC # BLD: 4.9 E9/L (ref 4.5–11.5)

## 2021-06-14 PROCEDURE — 80307 DRUG TEST PRSMV CHEM ANLYZR: CPT

## 2021-06-14 PROCEDURE — 80179 DRUG ASSAY SALICYLATE: CPT

## 2021-06-14 PROCEDURE — 87635 SARS-COV-2 COVID-19 AMP PRB: CPT

## 2021-06-14 PROCEDURE — 80143 DRUG ASSAY ACETAMINOPHEN: CPT

## 2021-06-14 PROCEDURE — 80053 COMPREHEN METABOLIC PANEL: CPT

## 2021-06-14 PROCEDURE — 81025 URINE PREGNANCY TEST: CPT

## 2021-06-14 PROCEDURE — 85025 COMPLETE CBC W/AUTO DIFF WBC: CPT

## 2021-06-14 PROCEDURE — 99285 EMERGENCY DEPT VISIT HI MDM: CPT

## 2021-06-14 PROCEDURE — 6370000000 HC RX 637 (ALT 250 FOR IP): Performed by: EMERGENCY MEDICINE

## 2021-06-14 PROCEDURE — 82077 ASSAY SPEC XCP UR&BREATH IA: CPT

## 2021-06-14 RX ORDER — NICOTINE 21 MG/24HR
1 PATCH, TRANSDERMAL 24 HOURS TRANSDERMAL DAILY
Status: DISCONTINUED | OUTPATIENT
Start: 2021-06-14 | End: 2021-06-15 | Stop reason: HOSPADM

## 2021-06-14 RX ORDER — LORAZEPAM 1 MG/1
2 TABLET ORAL ONCE
Status: COMPLETED | OUTPATIENT
Start: 2021-06-14 | End: 2021-06-14

## 2021-06-14 RX ORDER — GABAPENTIN 400 MG/1
400 CAPSULE ORAL ONCE
Status: COMPLETED | OUTPATIENT
Start: 2021-06-14 | End: 2021-06-14

## 2021-06-14 RX ADMIN — GABAPENTIN 400 MG: 400 CAPSULE ORAL at 17:05

## 2021-06-14 RX ADMIN — LORAZEPAM 2 MG: 1 TABLET ORAL at 16:07

## 2021-06-14 ASSESSMENT — ENCOUNTER SYMPTOMS
SHORTNESS OF BREATH: 0
CHEST TIGHTNESS: 0
ABDOMINAL PAIN: 0
BACK PAIN: 0

## 2021-06-14 NOTE — ED NOTES
Checked on patient, sitter in room. Food provided. Patient calm and cooperative at this time.       Fouzia Wright RN  06/14/21 6320

## 2021-06-14 NOTE — ED NOTES
All ligatures removed from room. Patient placed in safety gown. Patient's belongings were taken and locked away. Patient's belongings include the following: shirt, sandals, shorts, bra, underwear, cell phone and black suitcase. Patient resting comfortably in bed at this time with ECT at bedside.       Anabella Day  06/14/21 1550

## 2021-06-14 NOTE — ED NOTES
Bed: H2  Expected date:   Expected time:   Means of arrival:   Comments:  [id number] RN  06/14/21 1521

## 2021-06-14 NOTE — ED PROVIDER NOTES
26-year-old female presenting with concerns that she is withdrawing from her kratom. She used to be on gabapentin, she also did fentanyl and crack as well. She reports a history of lymphoma that has not been treated yet. History of fibromyalgia and neuropathy and anxiety and depression. She reports that she just wanted to stop everything, she wanted to die, she stopped all of her mental health medications and all of her medications. She then started kratom. When she ran out of that yesterday she did fentanyl and crack. Awake, alert, oriented x4. Anxious, rapid pressured speech. This is a reported new problem, persistent, moderate to severe, couple days duration, associate with her mental health challenges, stopping her medications and not being suicidal.         Family History   Problem Relation Age of Onset    Heart Disease Mother     Diabetes Father     Diabetes Brother     Diabetes Other     Diabetes Maternal Uncle     Heart Disease Maternal Uncle     Diabetes Paternal Uncle      Past Surgical History:   Procedure Laterality Date    ABDOMEN SURGERY      AXILLARY SURGERY Right 5/6/2021    EXCISIONAL BIOPSY RIGHT AXILLARY LYMPH NODE performed by Jayda Sinclair MD at [address]      vaginal ap and surgery bladder sling    MANDIBLE SURGERY      tmj surgery    WISDOM TOOTH EXTRACTION         Review of Systems   Respiratory: Negative for chest tightness and shortness of breath. Cardiovascular: Negative for chest pain. Gastrointestinal: Negative for abdominal pain. Musculoskeletal: Negative for back pain and neck pain. Psychiatric/Behavioral: Positive for agitation and suicidal ideas. The patient is nervous/anxious. All other systems reviewed and are negative. Physical Exam  Constitutional:       General: She is not in acute distress. Appearance: She is well-developed. HENT:      Head: Normocephalic and atraumatic.    Eyes:      Conjunctiva/sclera: Conjunctivae normal.      Pupils: Pupils are equal, round, and reactive to light. Neck:      Thyroid: No thyromegaly. Cardiovascular:      Rate and Rhythm: Normal rate and regular rhythm. Pulmonary:      Effort: Pulmonary effort is normal. No respiratory distress. Breath sounds: Normal breath sounds. Abdominal:      General: There is no distension. Palpations: Abdomen is soft. Tenderness: There is no abdominal tenderness. There is no guarding or rebound. Musculoskeletal:         General: No tenderness. Normal range of motion. Cervical back: Normal range of motion. Skin:     General: Skin is warm and dry. Findings: No erythema. Neurological:      Mental Status: She is alert and oriented to person, place, and time. Cranial Nerves: No cranial nerve deficit. Coordination: Coordination normal.   Psychiatric:         Mood and Affect: Mood is anxious. Affect is angry. Speech: Speech is rapid and pressured. Behavior: Behavior is agitated. Thought Content: Thought content is not paranoid or delusional. Thought content includes suicidal ideation. Thought content does not include homicidal ideation. Thought content does not include homicidal or suicidal plan.           Procedures     [facility]     ED Course as of Adolph 15 0027   Mon Jun 14, 2021   1655 Pt still anxious, asking for nicotine patch and gabapentin.      [SO]      ED Course User Index  [SO] Omid Channel, DO        ED Course as of [place] 15 0027   Mon Jun 14, 2021   1655 Pt still anxious, asking for nicotine patch and gabapentin.      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 Past Surgical History:  has a past surgical history that includes Mandible surgery; bladder repair; Abdomen surgery; [place] tooth extraction; and Axillary Surgery (Right, 5/6/2021). Social History:  reports that she has been smoking cigarettes. She has been smoking about 0.50 packs per day. She has never used smokeless tobacco. She reports current drug use. Drug: IV. She reports that she does not drink alcohol. Family History: family history includes Diabetes in her brother, father, maternal uncle, paternal uncle, and another family member; Heart Disease in her maternal uncle and mother. The patients home medications have been reviewed. Allergies:  Other and Sulfa antibiotics    -------------------------------------------------- RESULTS -------------------------------------------------    LABS:  Results for orders placed or performed during the hospital encounter of 06/14/21   COVID-19, Rapid    Specimen: Nasopharyngeal Swab   Result Value Ref Range    SARS-CoV-2, NAAT Not Detected Not Detected   CBC auto differential   Result Value Ref Range    WBC 4.9 4.5 - 11.5 E9/L    RBC 4.94 3.50 - 5.50 E12/L    Hemoglobin 13.9 11.5 - 15.5 g/dL    Hematocrit 42.1 34.0 - 48.0 %    MCV 85.2 80.0 - 99.9 fL    MCH 28.1 26.0 - 35.0 pg    MCHC 33.0 32.0 - 34.5 %    RDW 13.7 11.5 - 15.0 fL    Platelets 369 792 - 495 E9/L    MPV 9.4 7.0 - 12.0 fL    Neutrophils % 69.3 43.0 - 80.0 %    Immature Granulocytes % 0.2 0.0 - 5.0 %    Lymphocytes % 22.8 20.0 - 42.0 %    Monocytes % 6.5 2.0 - 12.0 %    Eosinophils % 0.8 0.0 - 6.0 %    Basophils % 0.4 0.0 - 2.0 %    Neutrophils Absolute 3.41 1.80 - 7.30 E9/L    Immature Granulocytes # 0.01 E9/L    Lymphocytes Absolute 1.12 (L) 1.50 - 4.00 E9/L    Monocytes Absolute 0.32 0.10 - 0.95 E9/L    Eosinophils Absolute 0.04 (L) 0.05 - 0.50 E9/L    Basophils Absolute 0.02 0.00 - 0.20 E9/L   Comprehensive metabolic panel   Result Value Ref Range    Sodium 140 132 - 146 mmol/L    Potassium 4.1 3.5 - 5.0 mmol/L    Chloride 105 98 - 107 mmol/L    CO2 23 22 - 29 mmol/L    Anion Gap 12 7 - 16 mmol/L    Glucose 136 (H) 74 - 99 mg/dL    BUN 11 6 - 20 mg/dL    CREATININE 1.0 0.5 - 1.0 mg/dL    GFR Non-African American 58 >=60 mL/min/1.73    GFR African American >60     Calcium 9.5 8.6 - 10.2 mg/dL    Total Protein 7.2 6.4 - 8.3 g/dL    Albumin 4.2 3.5 - 5.2 g/dL    Total Bilirubin 0.3 0.0 - 1.2 mg/dL    Alkaline Phosphatase 123 (H) 35 - 104 U/L    ALT 10 0 - 32 U/L    AST 18 0 - 31 U/L   Serum Drug Screen   Result Value Ref Range    Ethanol Lvl <10 mg/dL    Acetaminophen Level <5.0 (L) 10.0 - 19.9 mcg/mL    Salicylate, Serum <5.6 0.0 - 30.0 mg/dL    TCA Scrn NEGATIVE Cutoff:300 ng/mL   Urine Drug Screen   Result Value Ref Range    Amphetamine Screen, Urine NOT DETECTED Negative <1000 ng/mL    Barbiturate Screen, Ur NOT DETECTED Negative < 200 ng/mL    Benzodiazepine Screen, Urine NOT DETECTED Negative < 200 ng/mL    Cannabinoid Scrn, Ur NOT DETECTED Negative < 50ng/mL    Cocaine Metabolite Screen, Urine POSITIVE (A) Negative < 300 ng/mL    Opiate Scrn, Ur NOT DETECTED Negative < 300ng/mL    PCP Screen, Urine NOT DETECTED Negative < 25 ng/mL    Methadone Screen, Urine NOT DETECTED Negative <300 ng/mL    Oxycodone Urine NOT DETECTED Negative <100 ng/mL    FENTANYL SCREEN, URINE POSITIVE (A) Negative <1 ng/mL    Drug Screen Comment: see below    Pregnancy, urine   Result Value Ref Range    HCG(Urine) Pregnancy Test NEGATIVE NEGATIVE   POC Pregnancy Urine Qual   Result Value Ref Range    HCG, Urine, POC Negative Negative    Lot Number [id number]     Positive QC Pass/Fail Pass     Negative QC Pass/Fail Pass        RADIOLOGY:  No orders to display       ------------------------- NURSING NOTES AND VITALS REVIEWED ---------------------------  Date / Time Roomed:  6/14/2021  3:26 PM  ED Bed Assignment: 05/05    The nursing notes within the ED encounter and vital signs as below have been reviewed. Patient Vitals for the past 24 hrs:   BP Temp Temp src Pulse Resp SpO2 Height Weight   06/14/21 1528 (!) 161/90 98.3 °F (36.8 °C) Oral 83 18 98 % 5' 7\" (1.702 m) 180 lb (81.6 kg)       Oxygen Saturation Interpretation: Normal    ------------------------------------------ PROGRESS NOTES ------------------------------------------  Re-evaluation(s):    Patients symptoms show no change  Repeat physical examination is not changed    Counseling:  I have spoken with the patient and discussed todays results, in addition to providing specific details for the plan of care and counseling regarding the diagnosis and prognosis. Their questions are answered at this time and they are agreeable with the plan of admission.    --------------------------------- ADDITIONAL PROVIDER NOTES ---------------------------------  Consultations:   SW team consulted   This patient's ED course included: a personal history and physicial examination, re-evaluation prior to disposition, multiple bedside re-evaluations and IV medications    This patient has remained hemodynamically stable during their ED course. Diagnosis:  1. Suicidal ideation        Disposition:  Patient's disposition: Admit to mental health unit - medically cleared for admission  Patient's condition is stable.          Zainab Buenrostro DO  06/15/21 0028

## 2021-06-15 VITALS
TEMPERATURE: 97.6 F | HEART RATE: 60 BPM | BODY MASS INDEX: 28.25 KG/M2 | WEIGHT: 180 LBS | HEIGHT: 67 IN | OXYGEN SATURATION: 99 % | SYSTOLIC BLOOD PRESSURE: 147 MMHG | RESPIRATION RATE: 18 BRPM | DIASTOLIC BLOOD PRESSURE: 90 MMHG

## 2021-06-15 LAB — METER GLUCOSE: 129 MG/DL (ref 74–99)

## 2021-06-15 PROCEDURE — 82962 GLUCOSE BLOOD TEST: CPT

## 2021-06-15 PROCEDURE — 6370000000 HC RX 637 (ALT 250 FOR IP): Performed by: EMERGENCY MEDICINE

## 2021-06-15 RX ORDER — LORAZEPAM 1 MG/1
2 TABLET ORAL ONCE
Status: COMPLETED | OUTPATIENT
Start: 2021-06-15 | End: 2021-06-15

## 2021-06-15 RX ORDER — GABAPENTIN 400 MG/1
400 CAPSULE ORAL ONCE
Status: COMPLETED | OUTPATIENT
Start: 2021-06-15 | End: 2021-06-15

## 2021-06-15 RX ADMIN — LORAZEPAM 2 MG: 1 TABLET ORAL at 00:20

## 2021-06-15 RX ADMIN — GABAPENTIN 400 MG: 400 CAPSULE ORAL at 00:19

## 2021-06-15 NOTE — ED NOTES
This RN phoned [facility]. No female beds at this time.      Isa Martel RN  06/15/21 8219

## 2021-06-15 NOTE — ED NOTES
Patient case to be reviewed for admission to [facility] shortly.      KELLEY Petersen, [place]  06/14/21 7233

## 2021-06-15 NOTE — ED NOTES
Emergency Department [facility] AN AFFILIATE OF [facility] Biopsychosocial Assessment Note    Chief Complaint:     Patient is a 49year old, female presenting to ED for increased depression. Patient reports that she has Non-Hodgkin's Lymphoma cancer (diagnosed 4 months agio), diabetes, fibromyalgia, chronic pain. Intentionally stopped all medication 1 week ago because \"I'm tired\". Patient reports she began having increased body wide pain which led to increased depression, suicidal ideation, and relapsed on heroin & crack cocaine. Patient reports that she had 3 months of sobriety. Patient admits to suicidal ideation - no plan or intent. Patient denies homicidal ideation. MSE: Patient is alert & oriented x 4. Patient mood is sad, congruent affect. Patient thought process/speech pattern. Patient denies A/V hallucinations. Clinical Summary/History:     Patient reports a mental health hx of depression/anxiety, not actively treating with an outpatient provider for mental health - patient reports Palliative Care started her on Cymbalta & Gabapentin & PCP provided the trazadone and clonidine. Patient reports 1 psychiatric hospitalization at [facility] 2 years ago. Patient reports varying sleep, decreased appetite for 2-3 days, and increased feelings of hopelessness/helplessness. Patient denies hx of suicide attempts or self injurious behaviors. Patient admits to hx of heroin/crack cocaine. Last in drug/alcohol treatment at [place] [address] in Feb. 2021. Gender  [] Male [x] Female [] Transgender  [] Other    Sexual Orientation    [] Heterosexual [] Homosexual [] Bisexual [] Other    N/A    Suicidal Behavioral: CSSR-S Complete. [x] Reports:    [] Past [x] Present   [] Denies    Homicidal/ Violent Behavior  [] Reports:   [] Past [] Present   [x] Denies     Hallucinations/Delusions   [] Reports:   [x] Denies     Substance Use/Alcohol Use/Addiction: SBIRT Screen Complete.    [x] Reports: Heroin & Crack cocaine  [] Denies     Trauma History  [] Reports:  [] Denies     Collateral Information:       Level of Care/Disposition Plan  [] Home:   [] Outpatient Provider:   [] Crisis Unit:   [x] Inpatient Psychiatric Unit:  [] Other:     Patient was pink slipped by ED Doctor. SW will pursue inpatient admission for safety/stabilization.        KELLEY Raman, [facility]  06/14/21 4950

## 2021-06-15 NOTE — ED NOTES
SW spoke with Angella Starks at the [address] [place] [address] to initiate inpatient referral     KELLEY Lamb, [facility]  06/15/21 0129

## 2021-06-15 NOTE — ED NOTES
Per access center. Pt. Accepted at [facility]. Dr. Stacey Holcomb. [address]. # for report [phone]. Request pink slip faxed to # [phone]. Done per this TERRY.      Darin Herr RN  06/15/21 3240

## 2021-06-15 NOTE — ED NOTES
Patient asleep, no distress noted.   CO in place     Viaelieser Click, [place]  06/15/21 1281 89 yo F pmhx asthma, HTN, Afib on Eliquis, severe AS, CAD, PPM recent admission to [facility] with dyspnea found to have malignant pleural effusions and metastatic Pancreatic CA w/ mets to adrenals, liver, and lung. Presented to ED tonight with worsening SOB and MOULTON. Upon arrival to ED patient noted to be in respiratory distress with WOB requiring NIV. Ordered for Lasix and Nitroglycerin for acute pulmonary edema. Pt found to have  Acute Respiratory Distress / Dyspnea suspect from underlying cancer Right effusion and Acute on chronic diastolic CHF, post obstructive PNA not ruled out, & NELY.     Pt currently on a DASH/TLC diet of pureed consistency, honey thick fluids. Observed with poor PO intake during lunch. Pt had better PO intake of breakfast (observed breakfast tray from this AM, ate >50% of breakfast). Recommend trial of Ensure Enlive (provides 350 kcal, 20 g protein). Will also trial acceptance of Magic Cup fortified ice cream (provides 290 kcal, 9 g protein per 4 oz serving). Noted w/ trace edema bilateral ankles per nursing flow sheets. Ecchymosis on skin per nursing flow sheets.

## 2021-06-15 NOTE — ED NOTES
Patient awake. Aware breakfast trays ordered.   CO in place     [address] [facility], [address] [place] [address]  06/15/21 1589

## 2021-06-30 ENCOUNTER — TELEPHONE (OUTPATIENT)
Dept: INFUSION THERAPY | Age: 56
End: 2021-06-30

## 2021-06-30 NOTE — TELEPHONE ENCOUNTER
Referral received that pt had called into [facility] [facility] requesting that the medical oncologist provide a note stating that pt would need to be discharged from rehab/psych facility (recent notes show [facility]) because her mother is in hospice and is dying with 3 days to live. Staff had discussed with oncologist. As we are unable to verify pt's mother's condition, we are not able to provide this letter. Pt was advised that she would need to speak with her mother's physician to certify this condition and make this request. Pt was called at  (with PIN [phone]) to relay this information. Pt then stated that she was with her therapist and the plan was that she would need to discharge from the current facility, go home, see her mother, and then go to a rehab facility but she knows that she will need a letter stating her medical condition and that she is stable to attend their inpt facility. SW inquired as to which facility and if they had STEVEN and written request. Pt then states that she does not have a facility but inquired if it could be sent to \"10 different ones. \" SW explained that we were unable to do this but pt if a facility was identified they could request appropriate medical clearance as long as they followed appropriate HIPPA protocol including STEVEN, etc. Pt then requested our fax number which was provided to her and stated she would return call later.  KELLEY Diaz, LISW-S, OSW-C  Oncology Social Worker

## 2021-12-22 NOTE — TELEPHONE ENCOUNTER
PT CALLED TO [facility]-SAID SHE IS GOING OUT OF STATE AND WILL CRESCHEDULE IN [place]. . DID NOT SCHEDULE AT THIS TIME WHEN I OFFERED TO MAKE APPT

## 2022-08-09 PROCEDURE — 96374 THER/PROPH/DIAG INJ IV PUSH: CPT

## 2022-08-09 PROCEDURE — 99285 EMERGENCY DEPT VISIT HI MDM: CPT

## 2022-08-10 ENCOUNTER — APPOINTMENT (OUTPATIENT)
Dept: GENERAL RADIOLOGY | Age: 57
End: 2022-08-10
Payer: COMMERCIAL

## 2022-08-10 ENCOUNTER — TELEPHONE (OUTPATIENT)
Dept: ORTHOPEDIC SURGERY | Age: 57
End: 2022-08-10

## 2022-08-10 ENCOUNTER — HOSPITAL ENCOUNTER (EMERGENCY)
Age: 57
Discharge: HOME | End: 2022-08-10
Attending: EMERGENCY MEDICINE
Payer: COMMERCIAL

## 2022-08-10 VITALS
SYSTOLIC BLOOD PRESSURE: 118 MMHG | WEIGHT: 190 LBS | OXYGEN SATURATION: 97 % | HEIGHT: 67 IN | TEMPERATURE: 98.2 F | RESPIRATION RATE: 18 BRPM | DIASTOLIC BLOOD PRESSURE: 74 MMHG | BODY MASS INDEX: 29.82 KG/M2 | HEART RATE: 86 BPM

## 2022-08-10 DIAGNOSIS — R53.83 FATIGUE, UNSPECIFIED TYPE: ICD-10-CM

## 2022-08-10 DIAGNOSIS — S62.644A CLOSED NONDISPLACED FRACTURE OF PROXIMAL PHALANX OF RIGHT RING FINGER, INITIAL ENCOUNTER: ICD-10-CM

## 2022-08-10 DIAGNOSIS — K08.89 DENTALGIA: ICD-10-CM

## 2022-08-10 DIAGNOSIS — K12.1 STOMATITIS: Primary | ICD-10-CM

## 2022-08-10 LAB
ALBUMIN SERPL-MCNC: 3.5 G/DL (ref 3.5–5.2)
ALP BLD-CCNC: 97 U/L (ref 35–104)
ALT SERPL-CCNC: 11 U/L (ref 0–32)
ANION GAP SERPL CALCULATED.3IONS-SCNC: 6 MMOL/L (ref 7–16)
ANISOCYTOSIS: ABNORMAL
AST SERPL-CCNC: 25 U/L (ref 0–31)
BACTERIA: NORMAL /HPF
BASOPHILS ABSOLUTE: 0.02 E9/L (ref 0–0.2)
BASOPHILS RELATIVE PERCENT: 0.9 % (ref 0–2)
BILIRUB SERPL-MCNC: <0.2 MG/DL (ref 0–1.2)
BILIRUBIN URINE: NEGATIVE
BLOOD, URINE: NEGATIVE
BUN BLDV-MCNC: 13 MG/DL (ref 6–20)
BURR CELLS: ABNORMAL
CALCIUM SERPL-MCNC: 8.6 MG/DL (ref 8.6–10.2)
CHLORIDE BLD-SCNC: 99 MMOL/L (ref 98–107)
CLARITY: CLEAR
CO2: 31 MMOL/L (ref 22–29)
COLOR: YELLOW
CREAT SERPL-MCNC: 0.8 MG/DL (ref 0.5–1)
EKG ATRIAL RATE: 79 BPM
EKG P AXIS: 72 DEGREES
EKG P-R INTERVAL: 136 MS
EKG Q-T INTERVAL: 382 MS
EKG QRS DURATION: 74 MS
EKG QTC CALCULATION (BAZETT): 438 MS
EKG R AXIS: 39 DEGREES
EKG T AXIS: 46 DEGREES
EKG VENTRICULAR RATE: 79 BPM
EOSINOPHILS ABSOLUTE: 0 E9/L (ref 0.05–0.5)
EOSINOPHILS RELATIVE PERCENT: 2.4 % (ref 0–6)
EPITHELIAL CELLS, UA: NORMAL /HPF
GFR AFRICAN AMERICAN: >60
GFR NON-AFRICAN AMERICAN: >60 ML/MIN/1.73
GLUCOSE BLD-MCNC: 96 MG/DL (ref 74–99)
GLUCOSE URINE: NEGATIVE MG/DL
HCT VFR BLD CALC: 35.3 % (ref 34–48)
HEMOGLOBIN: 11.2 G/DL (ref 11.5–15.5)
KETONES, URINE: NEGATIVE MG/DL
LEUKOCYTE ESTERASE, URINE: ABNORMAL
LYMPHOCYTES ABSOLUTE: 0.65 E9/L (ref 1.5–4)
LYMPHOCYTES RELATIVE PERCENT: 31 % (ref 20–42)
MCH RBC QN AUTO: 27.2 PG (ref 26–35)
MCHC RBC AUTO-ENTMCNC: 31.7 % (ref 32–34.5)
MCV RBC AUTO: 85.7 FL (ref 80–99.9)
MONOCYTES ABSOLUTE: 0.32 E9/L (ref 0.1–0.95)
MONOCYTES RELATIVE PERCENT: 14.7 % (ref 2–12)
NEUTROPHILS ABSOLUTE: 1.11 E9/L (ref 1.8–7.3)
NEUTROPHILS RELATIVE PERCENT: 53.4 % (ref 43–80)
NITRITE, URINE: NEGATIVE
OVALOCYTES: ABNORMAL
PDW BLD-RTO: 14.4 FL (ref 11.5–15)
PH UA: 6 (ref 5–9)
PLATELET # BLD: 147 E9/L (ref 130–450)
PMV BLD AUTO: 9.2 FL (ref 7–12)
POIKILOCYTES: ABNORMAL
POTASSIUM SERPL-SCNC: 4.9 MMOL/L (ref 3.5–5)
PROTEIN UA: NEGATIVE MG/DL
RBC # BLD: 4.12 E12/L (ref 3.5–5.5)
RBC UA: NORMAL /HPF (ref 0–2)
REASON FOR REJECTION: NORMAL
REJECTED TEST: NORMAL
SARS-COV-2, NAAT: NOT DETECTED
SCHISTOCYTES: ABNORMAL
SODIUM BLD-SCNC: 136 MMOL/L (ref 132–146)
SPECIFIC GRAVITY UA: 1.02 (ref 1–1.03)
T4 TOTAL: 6.8 MCG/DL (ref 4.5–11.7)
TEAR DROP CELLS: ABNORMAL
TOTAL PROTEIN: 6.3 G/DL (ref 6.4–8.3)
TROPONIN, HIGH SENSITIVITY: 8 NG/L (ref 0–9)
TSH SERPL DL<=0.05 MIU/L-ACNC: 2.09 UIU/ML (ref 0.27–4.2)
UROBILINOGEN, URINE: 0.2 E.U./DL
WBC # BLD: 2.1 E9/L (ref 4.5–11.5)
WBC UA: NORMAL /HPF (ref 0–5)

## 2022-08-10 PROCEDURE — 36415 COLL VENOUS BLD VENIPUNCTURE: CPT

## 2022-08-10 PROCEDURE — 85025 COMPLETE CBC W/AUTO DIFF WBC: CPT

## 2022-08-10 PROCEDURE — 84436 ASSAY OF TOTAL THYROXINE: CPT

## 2022-08-10 PROCEDURE — 81001 URINALYSIS AUTO W/SCOPE: CPT

## 2022-08-10 PROCEDURE — 6370000000 HC RX 637 (ALT 250 FOR IP): Performed by: EMERGENCY MEDICINE

## 2022-08-10 PROCEDURE — 84443 ASSAY THYROID STIM HORMONE: CPT

## 2022-08-10 PROCEDURE — 84484 ASSAY OF TROPONIN QUANT: CPT

## 2022-08-10 PROCEDURE — 87635 SARS-COV-2 COVID-19 AMP PRB: CPT

## 2022-08-10 PROCEDURE — 73130 X-RAY EXAM OF HAND: CPT

## 2022-08-10 PROCEDURE — 6360000002 HC RX W HCPCS: Performed by: EMERGENCY MEDICINE

## 2022-08-10 PROCEDURE — 93005 ELECTROCARDIOGRAM TRACING: CPT | Performed by: EMERGENCY MEDICINE

## 2022-08-10 PROCEDURE — 71045 X-RAY EXAM CHEST 1 VIEW: CPT

## 2022-08-10 PROCEDURE — 2580000003 HC RX 258: Performed by: EMERGENCY MEDICINE

## 2022-08-10 PROCEDURE — 80053 COMPREHEN METABOLIC PANEL: CPT

## 2022-08-10 RX ORDER — 0.9 % SODIUM CHLORIDE 0.9 %
1000 INTRAVENOUS SOLUTION INTRAVENOUS ONCE
Status: COMPLETED | OUTPATIENT
Start: 2022-08-10 | End: 2022-08-10

## 2022-08-10 RX ORDER — KETOROLAC TROMETHAMINE 30 MG/ML
30 INJECTION, SOLUTION INTRAMUSCULAR; INTRAVENOUS ONCE
Status: COMPLETED | OUTPATIENT
Start: 2022-08-10 | End: 2022-08-10

## 2022-08-10 RX ORDER — HYDROCODONE BITARTRATE AND ACETAMINOPHEN 5; 325 MG/1; MG/1
1 TABLET ORAL EVERY 6 HOURS PRN
Qty: 12 TABLET | Refills: 0 | Status: SHIPPED | OUTPATIENT
Start: 2022-08-10 | End: 2022-08-13

## 2022-08-10 RX ADMIN — KETOROLAC TROMETHAMINE 30 MG: 30 INJECTION, SOLUTION INTRAMUSCULAR; INTRAVENOUS at 01:58

## 2022-08-10 RX ADMIN — LIDOCAINE HYDROCHLORIDE 5 ML: 20 SOLUTION ORAL; TOPICAL at 03:10

## 2022-08-10 RX ADMIN — SODIUM CHLORIDE 1000 ML: 9 INJECTION, SOLUTION INTRAVENOUS at 02:15

## 2022-08-10 ASSESSMENT — ENCOUNTER SYMPTOMS
VOMITING: 0
BACK PAIN: 0
EYE DISCHARGE: 0
DIARRHEA: 0
EYE PAIN: 0
COUGH: 1
SORE THROAT: 0
SINUS PRESSURE: 0
SHORTNESS OF BREATH: 0
ABDOMINAL DISTENTION: 0
EYE REDNESS: 0
WHEEZING: 0
NAUSEA: 0

## 2022-08-10 ASSESSMENT — PAIN SCALES - GENERAL
PAINLEVEL_OUTOF10: 8
PAINLEVEL_OUTOF10: 8

## 2022-08-10 ASSESSMENT — PAIN - FUNCTIONAL ASSESSMENT: PAIN_FUNCTIONAL_ASSESSMENT: 0-10

## 2022-08-10 ASSESSMENT — PAIN DESCRIPTION - LOCATION: LOCATION: MOUTH;GENERALIZED

## 2022-08-10 NOTE — ED PROVIDER NOTES
Patient is a 57 y/o female who presents to the ED with multiple complaints. Patient states that she was at her doctor's office and she was found to have a low WBC count. She states that she has ulcers on her tongue and in her mouth from this. She also reports left wisdom tooth pain for which she saw her dentist and was referred to an oral surgeon. She continues to have pain from this tooth. She also reports a cough productive of mucus. She denies any fever. Patient also states that she has been tired and fatigued and fears that her cancer is back. She has a history of lymphoma which was in remission. Patient also reports pain and swelling of her right middle finger. She states that she fell out of bed last night injuring her finger. She denies hitting her head or any other injury. Review of Systems   Constitutional:  Positive for fatigue. Negative for chills and fever. HENT:  Positive for dental problem and mouth sores. Negative for ear pain, sinus pressure and sore throat. Eyes:  Negative for pain, discharge and redness. Respiratory:  Positive for cough. Negative for shortness of breath and wheezing. Cardiovascular:  Negative for chest pain. Gastrointestinal:  Negative for abdominal distention, diarrhea, nausea and vomiting. Genitourinary:  Negative for dysuria and frequency. Musculoskeletal:  Positive for arthralgias and joint swelling. Negative for back pain. Skin:  Negative for rash and wound. Neurological:  Negative for weakness and headaches. Hematological:  Negative for adenopathy. All other systems reviewed and are negative. Physical Exam  Vitals and nursing note reviewed. Constitutional:       General: She is not in acute distress. HENT:      Head: Normocephalic and atraumatic.       Right Ear: External ear normal.      Left Ear: External ear normal.      Nose: Nose normal.      Mouth/Throat:      Mouth: Mucous membranes are moist.      Comments: Small ulceration noted at tip of tongue. Multiple dental caries. No evidence of abscess. Eyes:      Conjunctiva/sclera: Conjunctivae normal.      Pupils: Pupils are equal, round, and reactive to light. Cardiovascular:      Rate and Rhythm: Regular rhythm. Tachycardia present. Heart sounds: No murmur heard. Pulmonary:      Effort: Pulmonary effort is normal. No respiratory distress. Breath sounds: Normal breath sounds. No stridor. No wheezing, rhonchi or rales. Abdominal:      General: Bowel sounds are normal. There is no distension. Palpations: Abdomen is soft. Tenderness: There is no abdominal tenderness. There is no guarding. Musculoskeletal:         General: Normal range of motion. Right hand: Swelling, tenderness and bony tenderness present. Cervical back: Normal range of motion and neck supple. Skin:     General: Skin is warm and dry. Findings: No rash. Neurological:      Mental Status: She is alert and oriented to person, place, and time. Procedures     MDM             EKG:  Normal sinus rhythm with ventricular rate of 86. WI interval, QRS duration and QT interval within normal range. Normal axis. Nonspecific ST segment and T wave changes. No previous EKG.         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 Past Surgical History:  has a past surgical history that includes Mandible surgery; bladder repair; Abdomen surgery; [place] tooth extraction; and Axillary Surgery (Right, 5/6/2021). Social History:  reports that she has been smoking cigarettes. She has been smoking an average of .5 packs per day. She has never used smokeless tobacco. She reports current drug use. Drug: IV. She reports that she does not drink alcohol. Family History: family history includes Diabetes in her brother, father, maternal uncle, paternal uncle, and another family member; Heart Disease in her maternal uncle and mother. The patients home medications have been reviewed.     Allergies: Azithromycin, Other, and Sulfa antibiotics    -------------------------------------------------- RESULTS -------------------------------------------------  Labs:  Results for orders placed or performed during the hospital encounter of 08/10/22   COVID-19, Rapid    Specimen: Nasopharyngeal Swab   Result Value Ref Range    SARS-CoV-2, NAAT Not Detected Not Detected   CBC with Auto Differential   Result Value Ref Range    WBC 2.1 (L) 4.5 - 11.5 E9/L    RBC 4.12 3.50 - 5.50 E12/L    Hemoglobin 11.2 (L) 11.5 - 15.5 g/dL    Hematocrit 35.3 34.0 - 48.0 %    MCV 85.7 80.0 - 99.9 fL    MCH 27.2 26.0 - 35.0 pg    MCHC 31.7 (L) 32.0 - 34.5 %    RDW 14.4 11.5 - 15.0 fL    Platelets 952 035 - 818 E9/L    MPV 9.2 7.0 - 12.0 fL    Neutrophils % 53.4 43.0 - 80.0 %    Lymphocytes % 31.0 20.0 - 42.0 %    Monocytes % 14.7 (H) 2.0 - 12.0 %    Eosinophils % 2.4 0.0 - 6.0 %    Basophils % 0.9 0.0 - 2.0 %    Neutrophils Absolute 1.11 (L) 1.80 - 7.30 E9/L    Lymphocytes Absolute 0.65 (L) 1.50 - 4.00 E9/L    Monocytes Absolute 0.32 0.10 - 0.95 E9/L    Eosinophils Absolute 0.00 (L) 0.05 - 0.50 E9/L    Basophils Absolute 0.02 0.00 - 0.20 E9/L    Anisocytosis 1+     Poikilocytes 1+     Schistocytes 1+     Oliva Cells 1+     Ovalocytes 1+     Tear Drop Cells 1+    Comprehensive Metabolic Panel   Result Value Ref Range    Sodium 136 132 - 146 mmol/L    Potassium 4.9 3.5 - 5.0 mmol/L    Chloride 99 98 - 107 mmol/L    CO2 31 (H) 22 - 29 mmol/L    Anion Gap 6 (L) 7 - 16 mmol/L    Glucose 96 74 - 99 mg/dL    BUN 13 6 - 20 mg/dL    Creatinine 0.8 0.5 - 1.0 mg/dL    GFR Non- American >60 >=60 mL/min/1.73    GFR African American >60     Calcium 8.6 8.6 - 10.2 mg/dL    Total Protein 6.3 (L) 6.4 - 8.3 g/dL    Albumin 3.5 3.5 - 5.2 g/dL    Total Bilirubin <0.2 0.0 - 1.2 mg/dL    Alkaline Phosphatase 97 35 - 104 U/L    ALT 11 0 - 32 U/L    AST 25 0 - 31 U/L   TSH   Result Value Ref Range    TSH 2.090 0.270 - 4.200 uIU/mL   T4   Result Value Ref Range    T4, Total 6.8 4.5 - 11.7 mcg/dL   Urinalysis   Result Value Ref Range    Color, UA Yellow Straw/Yellow    Clarity, UA Clear Clear    Glucose, Ur Negative Negative mg/dL    Bilirubin Urine Negative Negative    Ketones, Urine Negative Negative mg/dL    Specific Gravity, UA 1.020 1.005 - 1.030    Blood, Urine Negative Negative    pH, UA 6.0 5.0 - 9.0    Protein, UA Negative Negative mg/dL    Urobilinogen, Urine 0.2 <2.0 E.U./dL    Nitrite, Urine Negative Negative    Leukocyte Esterase, Urine SMALL (A) Negative   Microscopic Urinalysis   Result Value Ref Range    WBC, UA 1-3 0 - 5 /HPF    RBC, UA NONE 0 - 2 /HPF    Epithelial Cells, UA MODERATE /HPF    Bacteria, UA NONE SEEN None Seen /HPF   SPECIMEN REJECTION   Result Value Ref Range    Rejected Test TRP5     Reason for Rejection see below    Troponin   Result Value Ref Range    Troponin, High Sensitivity 8 0 - 9 ng/L       Radiology:  XR HAND RIGHT (MIN 3 VIEWS)   Final Result   A fracture at the base of the 4th proximal phalanx. XR CHEST PORTABLE   Final Result   No acute process. ------------------------- NURSING NOTES AND VITALS REVIEWED ---------------------------  Date / Time Roomed:  8/10/2022 12:35 AM  ED Bed Assignment:  04/04    The nursing notes within the ED encounter and vital signs as below have been reviewed.    /74   Pulse 86   Temp 98.2 °F (36.8 °C) (Infrared)   Resp 18   Ht 5' 7\" (1.702 m)   Wt 190 lb (86.2 kg)   LMP 06/01/2018   SpO2 97%   BMI 29.76 kg/m²   Oxygen Saturation Interpretation: Normal      ------------------------------------------ PROGRESS NOTES ------------------------------------------  I have spoken with the patient and discussed todays results, in addition to providing specific details for the plan of care and counseling regarding the diagnosis and prognosis. Their questions are answered at this time and they are agreeable with the plan. I discussed at length with them reasons for immediate return here for re evaluation. They will followup with primary care by calling their office tomorrow. --------------------------------- ADDITIONAL PROVIDER NOTES ---------------------------------  At this time the patient is without objective evidence of an acute process requiring hospitalization or inpatient management. They have remained hemodynamically stable throughout their entire ED visit and are stable for discharge with outpatient follow-up. The plan has been discussed in detail and they are aware of the specific conditions for emergent return, as well as the importance of follow-up. New Prescriptions    HYDROCODONE-ACETAMINOPHEN (NORCO) 5-325 MG PER TABLET    Take 1 tablet by mouth every 6 hours as needed for Pain for up to 3 days. Intended supply: 3 days. Take lowest dose possible to manage pain    MAGIC MOUTHWASH (MIRACLE MOUTHWASH)    Swish and spit 5 mLs 4 times daily as needed for Irritation       Diagnosis:  1. Stomatitis    2. Closed nondisplaced fracture of proximal phalanx of right ring finger, initial encounter    3. Dentalgia    4. Fatigue, unspecified type        Disposition:  Patient's disposition: Discharge to home  Patient's condition is stable.            Dony De La Cruz, DO  08/10/22 [address] Olegario Qureshi, DO  08/10/22 [id number]

## 2022-08-10 NOTE — ED NOTES
Ulnar gutter splint applied; Dr. Rene Connell made aware. Circulation checks completed following application by two RNs.       General Call, RN  08/10/22 [id number]

## 2022-08-10 NOTE — TELEPHONE ENCOUNTER
Pt would like to dane an appt for a fx of RT ring finger. Pt was seen Verdene Hard on 8/10/22 and we also have a referral in the pt's chart. Please, advise on scheduling.  Kym Weiss can be reached at [phone]

## 2022-08-10 NOTE — TELEPHONE ENCOUNTER
Pt would like to dane an appt for a fx of RT ring finger. Pt was seen Mic Husain on 8/10/22 and we also have a referral in the pt's chart. Please, advise on scheduling.  Regina Smith can be reached at [phone]

## 2022-08-15 ENCOUNTER — OFFICE VISIT (OUTPATIENT)
Dept: ORTHOPEDIC SURGERY | Age: 57
End: 2022-08-15
Payer: COMMERCIAL

## 2022-08-15 VITALS — TEMPERATURE: 98 F | WEIGHT: 190 LBS | HEIGHT: 67 IN | BODY MASS INDEX: 29.82 KG/M2

## 2022-08-15 DIAGNOSIS — S62.614A CLOSED DISPLACED FRACTURE OF PROXIMAL PHALANX OF RIGHT RING FINGER, INITIAL ENCOUNTER: Primary | ICD-10-CM

## 2022-08-15 PROCEDURE — 4004F PT TOBACCO SCREEN RCVD TLK: CPT | Performed by: ORTHOPAEDIC SURGERY

## 2022-08-15 PROCEDURE — 3017F COLORECTAL CA SCREEN DOC REV: CPT | Performed by: ORTHOPAEDIC SURGERY

## 2022-08-15 PROCEDURE — G8427 DOCREV CUR MEDS BY ELIG CLIN: HCPCS | Performed by: ORTHOPAEDIC SURGERY

## 2022-08-15 PROCEDURE — 99203 OFFICE O/P NEW LOW 30 MIN: CPT | Performed by: ORTHOPAEDIC SURGERY

## 2022-08-15 PROCEDURE — G8419 CALC BMI OUT NRM PARAM NOF/U: HCPCS | Performed by: ORTHOPAEDIC SURGERY

## 2022-08-15 RX ORDER — BUPRENORPHINE 300 MG/1
300 SOLUTION SUBCUTANEOUS
COMMUNITY
Start: 2022-07-18

## 2022-08-15 NOTE — PROGRESS NOTES
Sienna Salinas is a 56y.o. year old female who presents today for evaluation of a right ring injury which occurred on 8/9/22. The patient reports that this injury occurred when she fell out of bed. The patient denies any other injuries. Movement makes the pain worse, the splint and resting makes the pain better. The patient's past medical history, medications, and review of systems was reviewed. On Physical Exam, Sienna Salinas is well-developed, well-nourished, and oriented to person, place and time. her gait is intact. On evaluation of her right upper extremity, there is obvious deformity. There is swelling and is ecchymosis. she is tender to palpation over the right ring PIP region, and otherwise nontender over the remainder of the extremity. Range of motion is decreased secondary to pain over the right ring finger. The skin overlying the right ring finger is intact without evidence of lesion, laceration or abrasion. Distal pulses are 2+ and symmetric bilaterally. Sensation is grossly intact to light touch and symmetric bilaterally. Xrays: There is a fracture at the base of the 4th proximal phalanx. The joint   spaces are preserved. No additional acute findings. Impression:    Encounter Diagnosis   Name Primary? Closed displaced fracture of proximal phalanx of right ring finger, initial encounter Yes     Plan:   Due to the displacement of the fracture, I will send her to a hand and upper extremity specialist.  If she has any further issues she will let us know.

## 2022-08-17 ENCOUNTER — TELEPHONE (OUTPATIENT)
Dept: ORTHOPEDIC SURGERY | Age: 57
End: 2022-08-17

## 2022-08-17 NOTE — TELEPHONE ENCOUNTER
Patient was seen 8/15 and referred to Dr. Lizeth Combs. Dr. Silvia Huff office states they could not see the patient due to him being out of town. Patient then referred to Dr. Jeffery Colby. Appointment made for 8/18 @ 9am.  I left 3 messages for the patient and she returned my call. She states she is unable to attend that appointment. Phone number was given to Dr. Nuvia Mendez office and patient will contact them herself. The patient's info was faxed and external referral placed. Patient was also instructed to obtain disc with xrays.

## 2022-08-26 ENCOUNTER — HOSPITAL ENCOUNTER (EMERGENCY)
Age: 57
Discharge: HOME | End: 2022-08-27
Attending: EMERGENCY MEDICINE
Payer: COMMERCIAL

## 2022-08-26 ENCOUNTER — APPOINTMENT (OUTPATIENT)
Dept: GENERAL RADIOLOGY | Age: 57
End: 2022-08-26
Payer: COMMERCIAL

## 2022-08-26 DIAGNOSIS — R50.82 POSTOPERATIVE FEVER: Primary | ICD-10-CM

## 2022-08-26 DIAGNOSIS — G89.18 POSTOPERATIVE PAIN: ICD-10-CM

## 2022-08-26 LAB
ALBUMIN SERPL-MCNC: 3.6 G/DL (ref 3.5–5.2)
ALP BLD-CCNC: 108 U/L (ref 35–104)
ALT SERPL-CCNC: 8 U/L (ref 0–32)
ANION GAP SERPL CALCULATED.3IONS-SCNC: 9 MMOL/L (ref 7–16)
APTT: 30.3 SEC (ref 24.5–35.1)
AST SERPL-CCNC: 9 U/L (ref 0–31)
BACTERIA: NORMAL /HPF
BASOPHILS ABSOLUTE: 0.02 E9/L (ref 0–0.2)
BASOPHILS RELATIVE PERCENT: 1 % (ref 0–2)
BILIRUB SERPL-MCNC: <0.2 MG/DL (ref 0–1.2)
BILIRUBIN URINE: NEGATIVE
BLOOD, URINE: NEGATIVE
BUN BLDV-MCNC: 13 MG/DL (ref 6–20)
CALCIUM SERPL-MCNC: 9.1 MG/DL (ref 8.6–10.2)
CHLORIDE BLD-SCNC: 97 MMOL/L (ref 98–107)
CLARITY: CLEAR
CO2: 32 MMOL/L (ref 22–29)
COLOR: NORMAL
CREAT SERPL-MCNC: 0.9 MG/DL (ref 0.5–1)
EOSINOPHILS ABSOLUTE: 0.11 E9/L (ref 0.05–0.5)
EOSINOPHILS RELATIVE PERCENT: 6 % (ref 0–6)
EPITHELIAL CELLS, UA: NORMAL /HPF
GFR AFRICAN AMERICAN: >60
GFR NON-AFRICAN AMERICAN: >60 ML/MIN/1.73
GLUCOSE BLD-MCNC: 100 MG/DL (ref 74–99)
GLUCOSE URINE: NEGATIVE MG/DL
HCT VFR BLD CALC: 35.7 % (ref 34–48)
HEMOGLOBIN: 11.5 G/DL (ref 11.5–15.5)
INR BLD: 1
KETONES, URINE: NEGATIVE MG/DL
LACTIC ACID, SEPSIS: 1.4 MMOL/L (ref 0.5–1.9)
LEUKOCYTE ESTERASE, URINE: NEGATIVE
LIPASE: 17 U/L (ref 13–60)
LYMPHOCYTES ABSOLUTE: 0.67 E9/L (ref 1.5–4)
LYMPHOCYTES RELATIVE PERCENT: 35 % (ref 20–42)
MCH RBC QN AUTO: 26.9 PG (ref 26–35)
MCHC RBC AUTO-ENTMCNC: 32.2 % (ref 32–34.5)
MCV RBC AUTO: 83.6 FL (ref 80–99.9)
METAMYELOCYTES RELATIVE PERCENT: 1 % (ref 0–1)
MONOCYTES ABSOLUTE: 1.01 E9/L (ref 0.1–0.95)
MONOCYTES RELATIVE PERCENT: 53 % (ref 2–12)
NEUTROPHILS ABSOLUTE: 0.1 E9/L (ref 1.8–7.3)
NEUTROPHILS RELATIVE PERCENT: 4 % (ref 43–80)
NITRITE, URINE: NEGATIVE
OVALOCYTES: ABNORMAL
PDW BLD-RTO: 13.4 FL (ref 11.5–15)
PH UA: 6 (ref 5–9)
PLATELET # BLD: 194 E9/L (ref 130–450)
PMV BLD AUTO: 9 FL (ref 7–12)
POIKILOCYTES: ABNORMAL
POLYCHROMASIA: ABNORMAL
POTASSIUM REFLEX MAGNESIUM: 4.4 MMOL/L (ref 3.5–5)
PROTEIN UA: NEGATIVE MG/DL
PROTHROMBIN TIME: 11.7 SEC (ref 9.3–12.4)
RBC # BLD: 4.27 E12/L (ref 3.5–5.5)
RBC UA: NORMAL /HPF (ref 0–2)
SARS-COV-2, NAAT: NOT DETECTED
SODIUM BLD-SCNC: 138 MMOL/L (ref 132–146)
SPECIFIC GRAVITY UA: 1.01 (ref 1–1.03)
STREP GRP A PCR: NEGATIVE
TOTAL PROTEIN: 6.4 G/DL (ref 6.4–8.3)
UROBILINOGEN, URINE: 0.2 E.U./DL
WBC # BLD: 1.9 E9/L (ref 4.5–11.5)
WBC UA: NORMAL /HPF (ref 0–5)

## 2022-08-26 PROCEDURE — 71046 X-RAY EXAM CHEST 2 VIEWS: CPT

## 2022-08-26 PROCEDURE — 80053 COMPREHEN METABOLIC PANEL: CPT

## 2022-08-26 PROCEDURE — 85730 THROMBOPLASTIN TIME PARTIAL: CPT

## 2022-08-26 PROCEDURE — 6360000002 HC RX W HCPCS: Performed by: EMERGENCY MEDICINE

## 2022-08-26 PROCEDURE — 99285 EMERGENCY DEPT VISIT HI MDM: CPT

## 2022-08-26 PROCEDURE — 87040 BLOOD CULTURE FOR BACTERIA: CPT

## 2022-08-26 PROCEDURE — 96374 THER/PROPH/DIAG INJ IV PUSH: CPT

## 2022-08-26 PROCEDURE — 83605 ASSAY OF LACTIC ACID: CPT

## 2022-08-26 PROCEDURE — 87880 STREP A ASSAY W/OPTIC: CPT

## 2022-08-26 PROCEDURE — 87635 SARS-COV-2 COVID-19 AMP PRB: CPT

## 2022-08-26 PROCEDURE — 83690 ASSAY OF LIPASE: CPT

## 2022-08-26 PROCEDURE — 93005 ELECTROCARDIOGRAM TRACING: CPT | Performed by: EMERGENCY MEDICINE

## 2022-08-26 PROCEDURE — 81001 URINALYSIS AUTO W/SCOPE: CPT

## 2022-08-26 PROCEDURE — 85025 COMPLETE CBC W/AUTO DIFF WBC: CPT

## 2022-08-26 PROCEDURE — 6370000000 HC RX 637 (ALT 250 FOR IP): Performed by: EMERGENCY MEDICINE

## 2022-08-26 PROCEDURE — 87088 URINE BACTERIA CULTURE: CPT

## 2022-08-26 PROCEDURE — 85610 PROTHROMBIN TIME: CPT

## 2022-08-26 RX ORDER — FENTANYL CITRATE 0.05 MG/ML
50 INJECTION, SOLUTION INTRAMUSCULAR; INTRAVENOUS ONCE
Status: COMPLETED | OUTPATIENT
Start: 2022-08-26 | End: 2022-08-26

## 2022-08-26 RX ORDER — ACETAMINOPHEN 500 MG
1000 TABLET ORAL ONCE
Status: COMPLETED | OUTPATIENT
Start: 2022-08-26 | End: 2022-08-26

## 2022-08-26 RX ADMIN — FENTANYL CITRATE 50 MCG: 50 INJECTION INTRAMUSCULAR; INTRAVENOUS at 21:11

## 2022-08-26 RX ADMIN — ACETAMINOPHEN 1000 MG: 500 TABLET ORAL at 22:09

## 2022-08-26 ASSESSMENT — PAIN DESCRIPTION - ORIENTATION: ORIENTATION: RIGHT

## 2022-08-26 ASSESSMENT — ENCOUNTER SYMPTOMS
ABDOMINAL DISTENTION: 0
WHEEZING: 0
SINUS PRESSURE: 0
EYE REDNESS: 0
NAUSEA: 0
EYE PAIN: 0
SORE THROAT: 1
EYE DISCHARGE: 0
DIARRHEA: 0
COUGH: 1
SHORTNESS OF BREATH: 0
VOMITING: 0
BACK PAIN: 0

## 2022-08-26 ASSESSMENT — PAIN DESCRIPTION - LOCATION: LOCATION: HAND

## 2022-08-26 ASSESSMENT — PAIN - FUNCTIONAL ASSESSMENT
PAIN_FUNCTIONAL_ASSESSMENT: 0-10
PAIN_FUNCTIONAL_ASSESSMENT: 0-10

## 2022-08-26 ASSESSMENT — PAIN SCALES - GENERAL
PAINLEVEL_OUTOF10: 10
PAINLEVEL_OUTOF10: 5
PAINLEVEL_OUTOF10: 10

## 2022-08-27 VITALS
OXYGEN SATURATION: 95 % | SYSTOLIC BLOOD PRESSURE: 117 MMHG | RESPIRATION RATE: 17 BRPM | DIASTOLIC BLOOD PRESSURE: 65 MMHG | HEART RATE: 90 BPM | TEMPERATURE: 99.8 F

## 2022-08-27 LAB
EKG ATRIAL RATE: 89 BPM
EKG P AXIS: 60 DEGREES
EKG P-R INTERVAL: 124 MS
EKG Q-T INTERVAL: 324 MS
EKG QRS DURATION: 68 MS
EKG QTC CALCULATION (BAZETT): 394 MS
EKG R AXIS: 33 DEGREES
EKG T AXIS: 42 DEGREES
EKG VENTRICULAR RATE: 89 BPM

## 2022-08-27 NOTE — ED PROVIDER NOTES
Patient is a 55 y/o female who presents to the ED with a fever, sore throat, cough and right hand pain. Patient states that she had \"major reconstructive surgery\" performed on her right hand yesterday. Today, she developed a fever. She states that she took Tylenol, ibuprofen and naproxen without relief. Her last dose was approximately 8 hours prior to arrival. She states that she has had a worsening sore throat for the past two days. She also admits to a cough and congestion. She denies any nausea, vomiting, diarrhea or dysuria. She has 10/10 pain of her right hand. She denies any known sick contacts. Review of Systems   Constitutional:  Positive for fever. Negative for chills. HENT:  Positive for congestion and sore throat. Negative for ear pain and sinus pressure. Eyes:  Negative for pain, discharge and redness. Respiratory:  Positive for cough. Negative for shortness of breath and wheezing. Cardiovascular:  Negative for chest pain. Gastrointestinal:  Negative for abdominal distention, diarrhea, nausea and vomiting. Genitourinary:  Negative for dysuria and frequency. Musculoskeletal:  Positive for arthralgias. Negative for back pain. Skin:  Negative for rash and wound. Neurological:  Negative for weakness and headaches. Hematological:  Negative for adenopathy. All other systems reviewed and are negative. Physical Exam  Vitals and nursing note reviewed. Constitutional:       General: She is not in acute distress. HENT:      Head: Normocephalic and atraumatic. Right Ear: External ear normal.      Left Ear: External ear normal.      Nose: Nose normal.      Mouth/Throat:      Mouth: Mucous membranes are moist.   Eyes:      Conjunctiva/sclera: Conjunctivae normal.      Pupils: Pupils are equal, round, and reactive to light. Cardiovascular:      Rate and Rhythm: Normal rate and regular rhythm. Heart sounds: No murmur heard.   Pulmonary:      Effort: Pulmonary effort is normal. No respiratory distress. Breath sounds: Normal breath sounds. No stridor. No wheezing, rhonchi or rales. Abdominal:      General: Bowel sounds are normal. There is no distension. Palpations: Abdomen is soft. Tenderness: There is no abdominal tenderness. There is no guarding. Musculoskeletal:         General: Normal range of motion. Cervical back: Normal range of motion and neck supple. Skin:     General: Skin is warm and dry. Neurological:      Mental Status: She is alert and oriented to person, place, and time. 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 Past Surgical History:  has a past surgical history that includes Mandible surgery; bladder repair; Abdomen surgery; [place] tooth extraction; and Axillary Surgery (Right, 5/6/2021). Social History:  reports that she has been smoking cigarettes. She has been smoking an average of .5 packs per day. She has never used smokeless tobacco. She reports current drug use. Drug: IV. She reports that she does not drink alcohol. Family History: family history includes Diabetes in her brother, father, maternal uncle, paternal uncle, and another family member; Heart Disease in her maternal uncle and mother. The patients home medications have been reviewed.     Allergies: Azithromycin, Other, and Sulfa antibiotics    -------------------------------------------------- RESULTS -------------------------------------------------  Labs:  Results for orders placed or performed during the hospital encounter of 08/26/22 Strep Screen Group A Throat    Specimen: Throat   Result Value Ref Range    Strep Grp A PCR Negative Negative   COVID-19, Rapid    Specimen: Nasopharyngeal Swab   Result Value Ref Range    SARS-CoV-2, NAAT Not Detected Not Detected   Lactate, Sepsis   Result Value Ref Range    Lactic Acid, Sepsis 1.4 0.5 - 1.9 mmol/L   CBC with Auto Differential   Result Value Ref Range    WBC 1.9 (L) 4.5 - 11.5 E9/L    RBC 4.27 3.50 - 5.50 E12/L    Hemoglobin 11.5 11.5 - 15.5 g/dL    Hematocrit 35.7 34.0 - 48.0 %    MCV 83.6 80.0 - 99.9 fL    MCH 26.9 26.0 - 35.0 pg    MCHC 32.2 32.0 - 34.5 %    RDW 13.4 11.5 - 15.0 fL    Platelets 279 546 - 184 E9/L    MPV 9.0 7.0 - 12.0 fL    Neutrophils % 4.0 (L) 43.0 - 80.0 %    Lymphocytes % 35.0 20.0 - 42.0 %    Monocytes % 53.0 (H) 2.0 - 12.0 %    Eosinophils % 6.0 0.0 - 6.0 %    Basophils % 1.0 0.0 - 2.0 %    Neutrophils Absolute 0.10 (L) 1.80 - 7.30 E9/L    Lymphocytes Absolute 0.67 (L) 1.50 - 4.00 E9/L    Monocytes Absolute 1.01 (H) 0.10 - 0.95 E9/L    Eosinophils Absolute 0.11 0.05 - 0.50 E9/L    Basophils Absolute 0.02 0.00 - 0.20 E9/L    Metamyelocytes Relative 1.0 0.0 - 1.0 %    Polychromasia 1+     Poikilocytes 1+     Ovalocytes 1+    Comprehensive Metabolic Panel w/ Reflex to MG   Result Value Ref Range    Sodium 138 132 - 146 mmol/L    Potassium reflex Magnesium 4.4 3.5 - 5.0 mmol/L    Chloride 97 (L) 98 - 107 mmol/L    CO2 32 (H) 22 - 29 mmol/L    Anion Gap 9 7 - 16 mmol/L    Glucose 100 (H) 74 - 99 mg/dL    BUN 13 6 - 20 mg/dL    Creatinine 0.9 0.5 - 1.0 mg/dL    GFR Non-African American >60 >=60 mL/min/1.73    GFR African American >60     Calcium 9.1 8.6 - 10.2 mg/dL    Total Protein 6.4 6.4 - 8.3 g/dL    Albumin 3.6 3.5 - 5.2 g/dL    Total Bilirubin <0.2 0.0 - 1.2 mg/dL    Alkaline Phosphatase 108 (H) 35 - 104 U/L    ALT 8 0 - 32 U/L    AST 9 0 - 31 U/L   Urinalysis with Microscopic   Result Value Ref Range    Color, UA Straw Straw/Yellow    Clarity, UA Clear Clear Glucose, Ur Negative Negative mg/dL    Bilirubin Urine Negative Negative    Ketones, Urine Negative Negative mg/dL    Specific Gravity, UA 1.015 1.005 - 1.030    Blood, Urine Negative Negative    pH, UA 6.0 5.0 - 9.0    Protein, UA Negative Negative mg/dL    Urobilinogen, Urine 0.2 <2.0 E.U./dL    Nitrite, Urine Negative Negative    Leukocyte Esterase, Urine Negative Negative    WBC, UA 1-3 0 - 5 /HPF    RBC, UA NONE 0 - 2 /HPF    Epithelial Cells, UA RARE /HPF    Bacteria, UA NONE SEEN None Seen /HPF   APTT   Result Value Ref Range    aPTT 30.3 24.5 - 35.1 sec   Protime-INR   Result Value Ref Range    Protime 11.7 9.3 - 12.4 sec    INR 1.0    Lipase   Result Value Ref Range    Lipase 17 13 - 60 U/L   EKG 12 lead   Result Value Ref Range    Ventricular Rate 89 BPM    Atrial Rate 89 BPM    P-R Interval 124 ms    QRS Duration 68 ms    Q-T Interval 324 ms    QTc Calculation (Bazett) 394 ms    P Axis 60 degrees    R Axis 33 degrees    T Axis 42 degrees       Radiology:  XR CHEST (2 VW)   Final Result   No acute cardiopulmonary pathology seen. Right chest wall MediPort.             ------------------------- NURSING NOTES AND VITALS REVIEWED ---------------------------  Date / Time Roomed:  8/26/2022  7:47 PM  ED Bed Assignment:  [place] [id number]    The nursing notes within the ED encounter and vital signs as below have been reviewed. /65   Pulse 90   Temp 99.8 °F (37.7 °C) (Oral)   Resp 17   LMP 06/01/2018   SpO2 95%   Oxygen Saturation Interpretation: Normal      ------------------------------------------ PROGRESS NOTES ------------------------------------------  I have spoken with the patient and discussed todays results, in addition to providing specific details for the plan of care and counseling regarding the diagnosis and prognosis. Their questions are answered at this time and they are agreeable with the plan. I discussed at length with them reasons for immediate return here for re evaluation.  They will followup with primary care by calling their office tomorrow. --------------------------------- ADDITIONAL PROVIDER NOTES ---------------------------------  At this time the patient is without objective evidence of an acute process requiring hospitalization or inpatient management. They have remained hemodynamically stable throughout their entire ED visit and are stable for discharge with outpatient follow-up. The plan has been discussed in detail and they are aware of the specific conditions for emergent return, as well as the importance of follow-up. New Prescriptions    No medications on file       Diagnosis:  1. Postoperative fever    2. Postoperative pain        Disposition:  Patient's disposition: Discharge to home  Patient's condition is stable.           [id number] [facility]  08/27/22 0139

## 2022-08-29 LAB — URINE CULTURE, ROUTINE: NORMAL

## 2022-09-01 LAB
BLOOD CULTURE, ROUTINE: NORMAL
CULTURE, BLOOD 2: NORMAL

## 2022-09-20 ENCOUNTER — ANESTHESIA EVENT (OUTPATIENT)
Dept: OPERATING ROOM | Age: 57
End: 2022-09-20
Payer: COMMERCIAL

## 2022-09-20 RX ORDER — PHENOL 1.4 %
AEROSOL, SPRAY (ML) MUCOUS MEMBRANE NIGHTLY
COMMUNITY

## 2022-09-20 RX ORDER — VIT C/B6/B5/MAGNESIUM/HERB 173 50-5-6-5MG
CAPSULE ORAL 2 TIMES DAILY
COMMUNITY

## 2022-09-20 RX ORDER — COVID-19 ANTIGEN TEST
2 KIT MISCELLANEOUS DAILY
COMMUNITY

## 2022-09-20 RX ORDER — DIPHENHYDRAMINE HCL 25 MG
25 TABLET ORAL 2 TIMES DAILY PRN
COMMUNITY

## 2022-09-20 NOTE — ANESTHESIA PRE PROCEDURE
Department of Anesthesiology  Preprocedure Note       Name:  Darien Ford   Age:  56 y.o.  :  1965                                          MRN:  [id number]         Date:  2022      Surgeon: Lonna Frankel):  Danielito Ogden MD    Procedure: Procedure(s):  RIGHT RING FINGER P1 METACARPOPHALANGEAL JOINT EXPLORATION ([address], COMPRESSION SCREW SET, REGULAR C-ARM, CANCELLOUS BONE GRAFT, BONE WAx) (cpt 78962)    Medications prior to admission:   Prior to Admission medications    Medication Sig Start Date End Date Taking? Authorizing Provider   Melatonin 10 MG TABS Take by mouth at bedtime   Yes Historical Provider, MD   turmeric (QC TUMERIC COMPLEX) 500 MG CAPS Take by mouth 2 times daily   Yes Historical Provider, MD   Naproxen Sodium (ALEVE) 220 MG CAPS Take 2 tablets by mouth Daily   Yes Historical Provider, MD   diphenhydrAMINE (BENADRYL) 25 MG tablet Take 25 mg by mouth 2 times daily as needed for Itching   Yes Historical Provider, MD   SUBLOCADE 300 MG/1.5ML SOSY injection Inject 300 mg into the skin every 30 days Started 22   Historical Provider, MD   acetaminophen (TYLENOL) 325 MG tablet Take 650 mg by mouth as needed for Pain    Historical Provider, MD   gabapentin (NEURONTIN) 800 MG tablet Take 1 tablet by mouth 3 times daily for 30 days. Patient takes 600 mg  Patient taking differently: Take 600 mg by mouth 4 times daily.  21  Marion Hanks MD   ibuprofen (ADVIL;MOTRIN) 800 MG tablet Take 800 mg by mouth every 6 hours as needed for Pain    Historical Provider, MD   traZODone (DESYREL) 150 MG tablet Take 200 mg by mouth nightly    Historical Provider, MD JEREZ 100 MG tablet Take 1 tablet by mouth daily 10/24/18   Phuong Martinez MD   albuterol sulfate HFA (PROVENTIL;VENTOLIN;PROAIR) 108 (90 Base) MCG/ACT inhaler Inhale 2 puffs into the lungs every 6 hours as needed for Wheezing    Historical Provider, MD       Current medications:    No current facility-administered medications for this encounter. Current Outpatient Medications   Medication Sig Dispense Refill    Melatonin 10 MG TABS Take by mouth at bedtime      turmeric (QC TUMERIC COMPLEX) 500 MG CAPS Take by mouth 2 times daily      Naproxen Sodium (ALEVE) 220 MG CAPS Take 2 tablets by mouth Daily      diphenhydrAMINE (BENADRYL) 25 MG tablet Take 25 mg by mouth 2 times daily as needed for Itching      SUBLOCADE 300 MG/1.5ML SOSY injection Inject 300 mg into the skin every 30 days Started 9-20-22      acetaminophen (TYLENOL) 325 MG tablet Take 650 mg by mouth as needed for Pain      gabapentin (NEURONTIN) 800 MG tablet Take 1 tablet by mouth 3 times daily for 30 days. Patient takes 600 mg (Patient taking differently: Take 600 mg by mouth 4 times daily.) 90 tablet 0    ibuprofen (ADVIL;MOTRIN) 800 MG tablet Take 800 mg by mouth every 6 hours as needed for Pain      traZODone (DESYREL) 150 MG tablet Take 200 mg by mouth nightly      JANUVIA 100 MG tablet Take 1 tablet by mouth daily 30 tablet 5    albuterol sulfate HFA (PROVENTIL;VENTOLIN;PROAIR) 108 (90 Base) MCG/ACT inhaler Inhale 2 puffs into the lungs every 6 hours as needed for Wheezing         Allergies:     Allergies   Allergen Reactions    Other      Pt admits that she says she may be allergic to erthyromycin-or zithromycin and caused skin reactions     Azithromycin Rash     Eyes swelled shut  ALL MYCINS CAUSE REACTION OF THIS TYPE    Sulfa Antibiotics Rash     swelling       Problem List:    Patient Active Problem List   Diagnosis Code    Type 2 diabetes mellitus with complication, without long-term current use of insulin ([facility]) E11.8    Vitamin D deficiency E55.9    B12 deficiency E53.8    Peripheral neuropathy G35.1    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 ([address] C82.04       Past Medical History:        Diagnosis Date    Asthma     Cancer ([address]     non hodgkin lymphoma  DX march 2021  had IV chemo In remission now    COVID-19 01/2022    asymptomatic w pos covid test    Fibromyalgia     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 [facility] 05/26/2021    Narcotic abuse ([facility]     Type 2 diabetes mellitus with complication, without long-term current use of insulin [facility] 10/23/2018       Past Surgical History:        Procedure Laterality Date    AXILLARY SURGERY Right 05/06/2021    EXCISIONAL BIOPSY RIGHT AXILLARY LYMPH NODE performed by Fransisco Lou MD at [address]      bladder suspension w mesh  & anter/post repair od vagina    HAND SURGERY      MANDIBLE SURGERY      tmj surgery    TUNNELED VENOUS PORT PLACEMENT      last used april 2022    WISDOM TOOTH EXTRACTION         Social History:    Social History     Tobacco Use    Smoking status: Every Day     Packs/day: 0.50     Years: 40.00     Pack years: 20.00     Types: Cigarettes    Smokeless tobacco: Never    Tobacco comments:     trying to quit   Substance Use Topics    Alcohol use: No                                Ready to quit: Not Answered  Counseling given: Not Answered  Tobacco comments: trying to quit      Vital Signs (Current):   Vitals:    09/20/22 0914   Weight: 195 lb (88.5 kg)   Height: 5' 7\" (1.702 m)                                              BP Readings from Last 3 Encounters:   08/27/22 117/65   08/10/22 118/74   06/15/21 (!) 147/90       NPO Status:                                                                                 BMI:   Wt Readings from Last 3 Encounters:   09/20/22 195 lb (88.5 kg)   08/15/22 190 lb (86.2 kg)   08/10/22 190 lb (86.2 kg)     Body mass index is 30.54 kg/m².     CBC:   Lab Results   Component Value Date/Time    WBC 1.9 08/26/2022 08:36 PM    RBC 4.27 08/26/2022 08:36 PM    HGB 11.5 08/26/2022 08:36 PM    HCT 35.7 08/26/2022 08:36 PM    MCV 83.6 08/26/2022 08:36 PM    RDW 13.4 08/26/2022 08:36 PM     08/26/2022 08:36 PM       CMP:   Lab Results   Component Value Date/Time     08/26/2022 08:36 PM    K 4.4 08/26/2022 08:36 PM    CL 97 08/26/2022 08:36 PM    CO2 32 08/26/2022 08:36 PM    BUN 13 08/26/2022 08:36 PM    CREATININE 0.9 08/26/2022 08:36 PM    GFRAA >60 08/26/2022 08:36 PM    LABGLOM >60 08/26/2022 08:36 PM    GLUCOSE 100 08/26/2022 08:36 PM    PROT 6.4 08/26/2022 08:36 PM    CALCIUM 9.1 08/26/2022 08:36 PM    BILITOT <0.2 08/26/2022 08:36 PM    ALKPHOS 108 08/26/2022 08:36 PM    AST 9 08/26/2022 08:36 PM    ALT 8 08/26/2022 08:36 PM       POC Tests: No results for input(s): POCGLU, POCNA, POCK, POCCL, POCBUN, POCHEMO, POCHCT in the last 72 hours. Coags:   Lab Results   Component Value Date/Time    PROTIME 11.7 08/26/2022 08:36 PM    INR 1.0 08/26/2022 08:36 PM    APTT 30.3 08/26/2022 08:36 PM       HCG (If Applicable):   Lab Results   Component Value Date    PREGTESTUR NEGATIVE 06/14/2021        ABGs: No results found for: PHART, PO2ART, NBE7FZQ, OIK7XTE, BEART, H2VVBJFF     Type & Screen (If Applicable):  No results found for: LABABO, LABRH    Drug/Infectious Status (If Applicable):  No results found for: HIV, HEPCAB    COVID-19 Screening (If Applicable):   Lab Results   Component Value Date/Time    COVID19 Not Detected 08/26/2022 08:36 PM    COVID19 Not Detected 04/29/2021 12:20 PM           Anesthesia Evaluation  Patient summary reviewed  Airway: Mallampati: I  TM distance: >3 FB   Neck ROM: full  Mouth opening: > = 3 FB   Dental:          Pulmonary: breath sounds clear to auscultation  (+) asthma: current smoker (0.5 PPD. smoked today, half a cigarette at 9 AM.)          Patient smoked on day of surgery. ROS comment: Narcotic Abuse.    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 ROS comment: Covid - 19 on 01/2022. Abdominal:             Vascular: negative vascular ROS. Other Findings:           Anesthesia Plan      MAC     ASA 3     (PER PATIENT REQUEST, PLEASE AVOID USING FENTANYL.)  Induction: intravenous. continuous noninvasive hemodynamic monitor    Anesthetic plan and risks discussed with patient. Plan discussed with CRNA.     Attending anesthesiologist reviewed and agrees with Nate Zhang MD   9/20/2022

## 2022-09-21 ENCOUNTER — HOSPITAL ENCOUNTER (OUTPATIENT)
Dept: OPERATING ROOM | Age: 57
Setting detail: OUTPATIENT SURGERY
Discharge: HOME | End: 2022-09-21
Attending: SURGERY
Payer: COMMERCIAL

## 2022-09-21 ENCOUNTER — ANESTHESIA (OUTPATIENT)
Dept: OPERATING ROOM | Age: 57
End: 2022-09-21
Payer: COMMERCIAL

## 2022-09-21 ENCOUNTER — HOSPITAL ENCOUNTER (OUTPATIENT)
Age: 57
Setting detail: OUTPATIENT SURGERY
Discharge: HOME | End: 2022-09-21
Attending: SURGERY | Admitting: SURGERY
Payer: COMMERCIAL

## 2022-09-21 VITALS
HEART RATE: 72 BPM | BODY MASS INDEX: 30.92 KG/M2 | HEIGHT: 67 IN | RESPIRATION RATE: 14 BRPM | TEMPERATURE: 97 F | WEIGHT: 197 LBS | SYSTOLIC BLOOD PRESSURE: 123 MMHG | OXYGEN SATURATION: 98 % | DIASTOLIC BLOOD PRESSURE: 90 MMHG

## 2022-09-21 DIAGNOSIS — M79.641 HAND PAIN, RIGHT: ICD-10-CM

## 2022-09-21 DIAGNOSIS — S62.634A CLOSED DISPLACED FRACTURE OF DISTAL PHALANX OF RIGHT RING FINGER, INITIAL ENCOUNTER: Primary | ICD-10-CM

## 2022-09-21 LAB
AMPHETAMINE SCREEN, URINE: NOT DETECTED
BARBITURATE SCREEN URINE: NOT DETECTED
BENZODIAZEPINE SCREEN, URINE: NOT DETECTED
CANNABINOID SCREEN URINE: NOT DETECTED
COCAINE METABOLITE SCREEN URINE: NOT DETECTED
FENTANYL SCREEN, URINE: NOT DETECTED
Lab: NORMAL
METER GLUCOSE: 112 MG/DL (ref 74–99)
METHADONE SCREEN, URINE: NOT DETECTED
OPIATE SCREEN URINE: NOT DETECTED
OXYCODONE URINE: NOT DETECTED
PHENCYCLIDINE SCREEN URINE: NOT DETECTED

## 2022-09-21 PROCEDURE — 7100000011 HC PHASE II RECOVERY - ADDTL 15 MIN: Performed by: SURGERY

## 2022-09-21 PROCEDURE — 82962 GLUCOSE BLOOD TEST: CPT | Performed by: SURGERY

## 2022-09-21 PROCEDURE — 3700000001 HC ADD 15 MINUTES (ANESTHESIA): Performed by: SURGERY

## 2022-09-21 PROCEDURE — 3600000014 HC SURGERY LEVEL 4 ADDTL 15MIN: Performed by: SURGERY

## 2022-09-21 PROCEDURE — 2709999900 HC NON-CHARGEABLE SUPPLY: Performed by: SURGERY

## 2022-09-21 PROCEDURE — 6360000002 HC RX W HCPCS: Performed by: SURGERY

## 2022-09-21 PROCEDURE — 6360000002 HC RX W HCPCS: Performed by: NURSE ANESTHETIST, CERTIFIED REGISTERED

## 2022-09-21 PROCEDURE — 7100000010 HC PHASE II RECOVERY - FIRST 15 MIN: Performed by: SURGERY

## 2022-09-21 PROCEDURE — 2580000003 HC RX 258: Performed by: SURGERY

## 2022-09-21 PROCEDURE — 80307 DRUG TEST PRSMV CHEM ANLYZR: CPT

## 2022-09-21 PROCEDURE — 3600000004 HC SURGERY LEVEL 4 BASE: Performed by: SURGERY

## 2022-09-21 PROCEDURE — 82962 GLUCOSE BLOOD TEST: CPT

## 2022-09-21 PROCEDURE — 2500000003 HC RX 250 WO HCPCS: Performed by: NURSE ANESTHETIST, CERTIFIED REGISTERED

## 2022-09-21 PROCEDURE — 3209999900 FLUORO FOR SURGICAL PROCEDURES

## 2022-09-21 PROCEDURE — 3700000000 HC ANESTHESIA ATTENDED CARE: Performed by: SURGERY

## 2022-09-21 PROCEDURE — 2500000003 HC RX 250 WO HCPCS: Performed by: SURGERY

## 2022-09-21 PROCEDURE — 2580000003 HC RX 258: Performed by: ANESTHESIOLOGY

## 2022-09-21 RX ORDER — FENTANYL CITRATE 50 UG/ML
INJECTION, SOLUTION INTRAMUSCULAR; INTRAVENOUS PRN
Status: DISCONTINUED | OUTPATIENT
Start: 2022-09-21 | End: 2022-09-21 | Stop reason: SDUPTHER

## 2022-09-21 RX ORDER — SODIUM CHLORIDE, SODIUM LACTATE, POTASSIUM CHLORIDE, CALCIUM CHLORIDE 600; 310; 30; 20 MG/100ML; MG/100ML; MG/100ML; MG/100ML
INJECTION, SOLUTION INTRAVENOUS CONTINUOUS
Status: DISCONTINUED | OUTPATIENT
Start: 2022-09-21 | End: 2022-09-21 | Stop reason: HOSPADM

## 2022-09-21 RX ORDER — CEPHALEXIN 500 MG/1
500 CAPSULE ORAL 3 TIMES DAILY
Qty: 16 CAPSULE | Refills: 0 | Status: SHIPPED | OUTPATIENT
Start: 2022-09-21

## 2022-09-21 RX ORDER — GLYCOPYRROLATE 0.2 MG/ML
INJECTION INTRAMUSCULAR; INTRAVENOUS PRN
Status: DISCONTINUED | OUTPATIENT
Start: 2022-09-21 | End: 2022-09-21 | Stop reason: SDUPTHER

## 2022-09-21 RX ORDER — PROPOFOL 10 MG/ML
INJECTION, EMULSION INTRAVENOUS CONTINUOUS PRN
Status: DISCONTINUED | OUTPATIENT
Start: 2022-09-21 | End: 2022-09-21 | Stop reason: SDUPTHER

## 2022-09-21 RX ORDER — MIDAZOLAM HYDROCHLORIDE 1 MG/ML
INJECTION INTRAMUSCULAR; INTRAVENOUS PRN
Status: DISCONTINUED | OUTPATIENT
Start: 2022-09-21 | End: 2022-09-21 | Stop reason: SDUPTHER

## 2022-09-21 RX ORDER — DEXMEDETOMIDINE HYDROCHLORIDE 100 UG/ML
INJECTION, SOLUTION INTRAVENOUS PRN
Status: DISCONTINUED | OUTPATIENT
Start: 2022-09-21 | End: 2022-09-21 | Stop reason: SDUPTHER

## 2022-09-21 RX ORDER — LIDOCAINE HYDROCHLORIDE 20 MG/ML
INJECTION, SOLUTION INTRAVENOUS PRN
Status: DISCONTINUED | OUTPATIENT
Start: 2022-09-21 | End: 2022-09-21 | Stop reason: SDUPTHER

## 2022-09-21 RX ADMIN — DEXMEDETOMIDINE HYDROCHLORIDE 40 MCG: 100 INJECTION, SOLUTION INTRAVENOUS at 14:49

## 2022-09-21 RX ADMIN — LIDOCAINE HYDROCHLORIDE 50 MG: 20 INJECTION, SOLUTION INTRAVENOUS at 14:49

## 2022-09-21 RX ADMIN — MIDAZOLAM 2 MG: 1 INJECTION INTRAMUSCULAR; INTRAVENOUS at 14:46

## 2022-09-21 RX ADMIN — PROPOFOL INJECTABLE EMULSION 100 MCG/KG/MIN: 10 INJECTION, EMULSION INTRAVENOUS at 14:49

## 2022-09-21 RX ADMIN — GLYCOPYRROLATE 0.2 MG: 0.2 INJECTION INTRAMUSCULAR; INTRAVENOUS at 14:57

## 2022-09-21 RX ADMIN — FENTANYL CITRATE 50 MCG: 50 INJECTION INTRAMUSCULAR; INTRAVENOUS at 14:49

## 2022-09-21 RX ADMIN — SODIUM CHLORIDE, POTASSIUM CHLORIDE, SODIUM LACTATE AND CALCIUM CHLORIDE: 600; 310; 30; 20 INJECTION, SOLUTION INTRAVENOUS at 11:44

## 2022-09-21 RX ADMIN — FENTANYL CITRATE 50 MCG: 50 INJECTION INTRAMUSCULAR; INTRAVENOUS at 14:53

## 2022-09-21 RX ADMIN — WATER 2000 MG: 1 INJECTION INTRAMUSCULAR; INTRAVENOUS; SUBCUTANEOUS at 14:43

## 2022-09-21 RX ADMIN — LIDOCAINE HYDROCHLORIDE 50 MG: 20 INJECTION, SOLUTION INTRAVENOUS at 15:19

## 2022-09-21 ASSESSMENT — PAIN - FUNCTIONAL ASSESSMENT: PAIN_FUNCTIONAL_ASSESSMENT: 0-10

## 2022-09-21 ASSESSMENT — LIFESTYLE VARIABLES: SMOKING_STATUS: 1

## 2022-09-21 ASSESSMENT — PAIN DESCRIPTION - DESCRIPTORS: DESCRIPTORS: ACHING

## 2022-09-21 NOTE — H&P
History and Physical    Patient's Name/Date of Birth: Yeny Serna / 1965 (78 y.o.)    Date: September 21, 2022     Chief Complaint: Right hand, ring finger pain, status post surgery. HPI:   This is a 56years of age, right-hand-dominant, white female, who presented to our [facility] office for initial evaluation on 08/23/2022. The patient was noted to have an unstable right hand, ring finger P1 (proximal phalanx) fracture. Patient underwent outpatient hand surgery, no form of fracture stabilization, which included compression screw. She had the post-operative complication of movement/proximal migration of the screw into the right ring finger MP (metacarpophalangeal) joint. We did review the different treatment options, including non-surgical ones. I do think the only reasonable option is to have better fixation with advancement of the screw, or exchange of the screw for pins x2. 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 [address]      bladder suspension w mesh  & anter/post repair od vagina    HAND SURGERY      MANDIBLE SURGERY      tmj surgery    TUNNELED VENOUS PORT PLACEMENT      last used april 2022    WISDOM TOOTH EXTRACTION         Prior to Admission medications    Medication Sig Start Date End Date Taking?  Authorizing Provider   Melatonin 10 MG TABS Take by mouth at bedtime   Yes Historical Provider, MD   turmeric (QC TUMERIC COMPLEX) 500 MG CAPS Take by mouth 2 times daily   Yes Historical Provider, MD   Naproxen Sodium (ALEVE) 220 MG CAPS Take 2 tablets by mouth Daily   Yes Historical Provider, MD   diphenhydrAMINE (BENADRYL) 25 MG tablet Take 25 mg by mouth 2 times daily as needed for Itching   Yes Historical Provider, MD   SUBLOCADE 300 MG/1.5ML SOSY injection Inject 300 mg into the skin every 30 days Started 9-20-22 7/18/22   Historical Provider, MD   acetaminophen (TYLENOL) 325 MG tablet Take 650 mg by mouth as needed for Pain    Historical Provider, MD   gabapentin (NEURONTIN) 800 MG tablet Take 1 tablet by mouth 3 times daily for 30 days. Patient takes 600 mg  Patient taking differently: Take 600 mg by mouth 4 times daily.  4/28/21 5/28/21  Mala Mckeon MD   ibuprofen (ADVIL;MOTRIN) 800 MG tablet Take 800 mg by mouth every 6 hours as needed for Pain    Historical Provider, MD   traZODone (DESYREL) 150 MG tablet Take 200 mg by mouth nightly    Historical Provider, MD   JANUVIA 100 MG tablet Take 1 tablet by mouth daily 10/24/18   Sunny Huerta MD   albuterol sulfate HFA (PROVENTIL;VENTOLIN;PROAIR) 108 (90 Base) MCG/ACT inhaler Inhale 2 puffs into the lungs every 6 hours as needed for Wheezing    Historical Provider, MD       Other, Azithromycin, and Sulfa antibiotics    Family History   Problem Relation Age of Onset    Heart Disease Mother     Diabetes Father     Diabetes Brother     Diabetes Other     Diabetes Maternal Uncle     Heart Disease Maternal Uncle     Diabetes Paternal Uncle        Social History     Socioeconomic History    Marital status: Legally      Spouse name: Not on file    Number of children: Not on file    Years of education: Not on file    Highest education level: Not on file   Occupational History    Not on file   Tobacco Use    Smoking status: Every Day     Packs/day: 0.50     Years: 40.00     Pack years: 20.00     Types: Cigarettes Smokeless tobacco: Never    Tobacco comments:     trying to quit   Vaping Use    Vaping Use: Every day    Substances: Nicotine    Devices: Disposable   Substance and Sexual Activity    Alcohol use: No    Drug use: Not Currently     Types: IV     Comment: heroin and crack last used july 2019    Sexual activity: Not on file   Other Topics Concern    Not on file   Social History Narrative    Not on file     Social Determinants of Health     Financial Resource Strain: Not on file   Food Insecurity: Not on file   Transportation Needs: Not on file   Physical Activity: Not on file   Stress: Not on file   Social Connections: Not on file   Intimate Partner Violence: Not on file   Housing Stability: Not on file       Review of Systems:   CONSTITUTIONAL:  negative  EYES:  negative  HEENT:  negative  RESPIRATORY:  negative  CARDIOVASCULAR:  negative  GASTROINTESTINAL:  negative  GENITOURINARY:  negative  INTEGUMENT/BREAST:  negative  HEMATOLOGIC/LYMPHATIC:  negative  ALLERGIC/IMMUNOLOGIC:  negative  ENDOCRINE:  negative  MUSCULOSKELETAL:  negative  NEUROLOGICAL:  negative  BEHAVIOR/PSYCH:  negative    Physical Exam:  Vitals:    09/20/22 0914 09/21/22 1052   BP:  95/61   Pulse:  74   Resp:  16   Temp:  97 °F (36.1 °C)   TempSrc:  Temporal   SpO2:  98%   Weight: 195 lb (88.5 kg) 197 lb (89.4 kg)   Height: 5' 7\" (1.702 m)        CONSTITUTIONAL:  awake, alert, cooperative, no apparent distress, and appears stated age  EYES:  Lids and lashes normal, pupils equal, round and reactive to light, extra ocular muscles intact, sclera clear, conjunctiva normal  ENT:  Normocephalic, without obvious abnormality, atraumatic, sinuses nontender on palpation, external ears without lesions, oral pharynx with moist mucus membranes, tonsils without erythema or exudates, gums normal and good dentition.   NECK:  Supple, symmetrical, trachea midline, no adenopathy, thyroid symmetric, not enlarged and no tenderness, skin normal  HEMATOLOGIC/LYMPHATICS:  no cervical lymphadenopathy  BACK:  Symmetric, no curvature, spinous processes are non-tender on palpation, paraspinous muscles are non-tender on palpation, no costal vertebral tenderness  LUNGS:  No increased work of breathing, good air exchange, clear to auscultation bilaterally, no crackles or wheezing  CARDIOVASCULAR:  normal S1 and S2  ABDOMEN:  deferred  CHEST/BREASTS:  deferred  GENITAL/URINARY:  deferred  MUSCULOSKELETAL: Right hand, ring finger + angle deformity at the MP joint, estimated 40 degrees  + Discoloration, red, mild, at surgery site.  + Edema, mild to moderate, at surgery site.  + Pain, moderate, at surgery site. NEUROLOGIC:  Awake, alert, oriented to name, place and time. Cranial nerves II-XII are grossly intact. Motor is 5 out of 5 bilaterally. Cerebellar finger to nose, heel to shin intact. Sensory is intact. Babinski down going, Romberg negative, and gait is normal.  SKIN:  see above    Assessment:  Active Problems:    * No active hospital problems. *  Resolved Problems:    * No resolved hospital problems. *  Right hand, ring finger-status post P1 (proximal phalanx) ORIF (open reduction internal fixation) with compression screw, Arthrex 2.5/22 mm  2. Complication-proximal migration of hardware into the MP (metacarpophalangeal) joint. Plan:  Right hand, ring finger MP joint exploration with fixation of P1 fracture.     Electronically signed by Sohan Peña MD on 9/21/22 at 12:55 PM EDT

## 2022-09-21 NOTE — ANESTHESIA POSTPROCEDURE EVALUATION
Department of Anesthesiology  Postprocedure Note    Patient: Carl Cowden  MRN: [id number]  YOB: 1965  Date of evaluation: 9/21/2022      Procedure Summary     Date: 09/21/22 Room / Location: [address] [place] [address] / [facility] AT [facility] ([facility])    Anesthesia Start: 7254 Anesthesia Stop: 5719    Procedure: RIGHT RING FINGER P1 METACARPOPHALANGEAL JOINT EXPLORATION (ARTHRE[place], COMPRESSION SCREW SET, REGULAR C-ARM, CANCELLOUS BONE GRAFT, BONE WAx) (cpt 83604) (Right: Hand) Diagnosis:       Displaced fracture of proximal phalanx of right ring finger, sequela      (Displaced fracture of proximal phalanx of right ring finger, sequela [S62.614S])    Surgeons: Chyna Belle MD Responsible Provider: Ni Mcclain MD    Anesthesia Type: MAC ASA Status: 3          Anesthesia Type: MAC    Kaylyn Phase I: Kaylyn Score: 10    Kaylyn Phase II: Kaylyn Score: 9      Anesthesia Post Evaluation    Patient location during evaluation: PACU  Patient participation: complete - patient participated  Level of consciousness: awake  Pain score: 0  Airway patency: patent  Nausea & Vomiting: no nausea  Complications: no  Cardiovascular status: blood pressure returned to baseline  Respiratory status: acceptable  Hydration status: euvolemic

## 2022-09-21 NOTE — DISCHARGE INSTRUCTIONS
Ju Ghotra M.D. Reconstructive Microsurgery      Elevate your operative hand above your heart level for one week. 2. Do NOT change your dressing. 3. Okay to shower or bathe with plastic bag over \"boxing glove\" dressing. 4. Follow up with Dr. Christiano Farris at [facility],  on date 09/29/22.  5. Tuesday at 2:00 p.  6. Patient will be excused from work or school until after first post operative re-check. 7. No driving unless off pain medication for 24 hrs., and only allowed to drive with the unoperated hand. 8. Dr. Christiano Farris does not have the Medical/Dental bureau service. If you need to reach Dr. Christiano Farris after hours,        please call one of the [facility] hospitals:    **  Please tell the , You recently had surgery w/ Dr. Christiano Farris, and have him paged via his cell phone. Please make sure they do not connect to Office, since no when is there after hours. [facility] [phone]    [address] [address] [place]: [phone] or [facility] at [phone] and they will page him. Josy Mazariegos M.D. [address] [place], [facility] 79                                                      [phone]                                                                      Nausea and Vomiting After Surgery: Care Instructions  Your Care Instructions     After you've had surgery, you may feel sick to your stomach (nauseated) or you may vomit. Sometimes anesthesia can make you feel sick. It's a common side effect and often doesn't last long. Pain also can make you feel sick or vomit. After the anesthesia wears off, you may feel pain from the incision (cut). That pain could then upset your stomach.  Taking pain medicine can also make you feel sick to your stomach. Whatever the cause, you may get medicine that can help. There are also some things you can do at home to prevent nausea and feel better. The doctor has checked you carefully, but problems can develop later. If you notice any problems or new symptoms, get medical treatment right away. Follow-up care is a key part of your treatment and safety. Be sure to make and go to all appointments, and call your doctor if you are having problems. It's also a good idea to know your test results and keep a list of the medicines you take. How can you care for yourself at home? Be safe with medicines. Read and follow all instructions on the label. If the doctor gave you a prescription medicine for pain, take it as prescribed. If you are not taking a prescription pain medicine, ask your doctor if you can take an over-the-counter medicine. Take your pain medicine as soon as you have pain. It works better if you take it before the pain gets bad. Call your doctor if you have any problems with your medicine. Rest in bed until you feel better. To prevent dehydration, drink plenty of fluids. Choose water and other clear liquids until you feel better. If you have kidney, heart, or liver disease and have to limit fluids, talk with your doctor before you increase the amount of fluids you drink. When you are able to eat, try clear soups, mild foods, and liquids until all symptoms are gone for 12 to 48 hours. Other good choices include dry toast, crackers, cooked cereal, and gelatin dessert, such as Jell-O. Do not smoke. Smoking and being around smoke can make nausea worse. If you need help quitting, talk to your doctor about stop-smoking programs and medicines. These can increase your chances of quitting for good. When should you call for help? Call 911  anytime you think you may need emergency care. For example, call if:    You passed out (lost consciousness).    Call your doctor now or seek immediate medical care if:    You have new or worse nausea or vomiting. You are too sick to your stomach to drink any fluids. You cannot keep down fluids. You have symptoms of dehydration, such as:  Dry eyes and a dry mouth. Passing only a little urine. Feeling thirstier than usual.     Your pain medicine is not helping. You are dizzy or lightheaded, or you feel like you may faint. Watch closely for changes in your health, and be sure to contact your doctor if:    You do not get better as expected. Current as of: March 9, 2022               Content Version: 13.4  © 2006-2022 [employer]. Care instructions adapted under license by [facility]. If you have questions about a medical condition or this instruction, always ask your healthcare professional. [address] any warranty or liability for your use of this information. Infection After Surgery: Care Instructions  Overview  After surgery, an infection is always possible. It doesn't mean that the surgery didn't go well. Because an infection can be serious, your doctor has taken steps to manage it. Your doctor checked the infection and cleaned it if necessary. Your doctor may have made an opening in the area so that the pus can drain out. You may have gauze in the cut so that the area will stay open and keep draining. You may need antibiotics. You will need to follow up with your doctor to make sure the infection has gone away. Follow-up care is a key part of your treatment and safety. Be sure to make and go to all appointments, and call your doctor if you are having problems. It's also a good idea to know your test results and keep a list of the medicines you take. How can you care for yourself at home? Make sure your surgeon knows about the infection, especially if you saw another doctor about your symptoms. If your doctor prescribed antibiotics, take them as directed.  Do not stop taking them just because you feel better. You need to take the full course of antibiotics. Ask your doctor if you can take an over-the-counter pain medicine, such as acetaminophen (Tylenol), ibuprofen (Advil, Motrin), or naproxen (Aleve). Be safe with medicines. Read and follow all instructions on the label. Do not take two or more pain medicines at the same time unless the doctor told you to. Many pain medicines have acetaminophen, which is Tylenol. Too much acetaminophen (Tylenol) can be harmful. Prop up the area on a pillow anytime you sit or lie down during the next 3 days. Try to keep it above the level of your heart. This will help reduce swelling. Keep the skin clean and dry. You may have a bandage over the cut (incision). A bandage helps the incision heal and protects it. Your doctor will tell you how to take care of this. Keep it clean and dry. You may have drainage from the wound. If your doctor told you how to care for your incision, follow your doctor's instructions. If you did not get instructions, follow this general advice:  Wash around the incision with clean water 2 times a day. Don't use hydrogen peroxide or alcohol, which can slow healing. When should you call for help? Call your doctor now or seek immediate medical care if:    You have signs that your infection is getting worse, such as: Increased pain, swelling, warmth, or redness in the area. Red streaks leading from the area. Pus draining from the wound. A new or higher fever. Watch closely for changes in your health, and be sure to contact your doctor if you have any problems. Where can you learn more? Go to https://[email or web address]. org and sign in to your [employer] account. Enter C340 in the SiteWit box to learn more about \"Infection After Surgery: Care Instructions. \"     If you do not have an account, please click on the \"Sign Up Now\" link.   Current as of: January 20, 2022               Content Version: 13.4  © 4342-0961 [employer]. Care instructions adapted under license by [facility] ([facility]). If you have questions about a medical condition or this instruction, always ask your healthcare professional. [address] any warranty or liability for your use of this information.

## 2022-09-21 NOTE — BRIEF OP NOTE
Brief Postoperative Note      Patient: Jagdish Knott  YOB: 1965  MRN: [id number]    Date of Procedure: 9/21/2022    Pre-Op Diagnosis:   Right hand, ring finger- Displaced fracture of proximal phalanx, sequela [S62.614S]    Post-Op Diagnosis: Same       Procedure(s):  RIGHT RING FINGER- P1 METACARPOPHALANGEAL JOINT EXPLORATION  (cpt U6391832)        Adjustment of hardware.   Surgeon(s):  Chris Hernandez M.D.; Hand + Reconstructive Surgery    Assistant:  * No surgical staff found *    Anesthesia: Monitor Anesthesia Care + Local    Estimated Blood Loss (mL): Minimal 3 ml    Complications: None  Specimens:   * No specimens in log *  Implants:  * No implants in log *    Drains: * No LDAs found *    Findings: see OP    Electronically signed by Flavio Breaux MD on 9/21/2022 at [address] [facility]

## 2022-10-01 NOTE — OP NOTE
[address]                                OPERATIVE REPORT    PATIENT NAME: Catarino Khan                   :        1965  MED REC NO:   [id number]                            ROOM:  ACCOUNT NO:   [de-identified]                           ADMIT DATE: 2022  PROVIDER:     Guanakito Lu MD    DATE OF PROCEDURE:  2022    PREOPERATIVE DIAGNOSES:  1. Right hand, ring finger - P1 (= proximal phalanx) fracture. 2. Complications, migration proximally of internal screw, into the MP  (metacarpophalangeal) joint. POSTOPERATIVE DIAGNOSES:  1. Right hand, ring finger - P1 (= proximal phalanx) fracture. 2. Complications, migration proximally of internal screw, into the MP  (metacarpophalangeal) joint. OPERATION:  Right hand, ring finger, MP joint, Exploration, with  adjustment of hardware/advancement. SURGEON:  Guanakito Lu MD; Hand + Reconstructive Surgery. ANESTHESIA TEAM:  Anesthesiologists + Crystal Leal CRNA. ANESTHESIA:  Monitored anesthesia control + right volar wrist, ulnar  nerve regional block + DCU (dorsal cutaneous of the ulnar nerve)  regional block, performed by Dr. Savannah Uribe in a standard fashion; utilizing  a 50/50 combination of 1% Lidocaine + 0.25% Bupivacaine (5+5 mL). IN:  300 ml crystalloid. OUT:  Minimal.    ESTIMATED BLOOD LOSS: 3 ml. SPECIMEN:  None. CULTURE:  None. DRAIN:  None. INDICATIONS: This is a 56years of age, right-hand dominant, white  female, whose history of present illness is dictated on her previous  Operative Note. Everything seemingly has gone fairly well in the early  post-operative period. The patient is semi-compliant with her dressing. One office visit the entire dressing and splint were completely soaked,  since not well protected with showering. The patient has taken her  splint off and rewrapped it at least on a couple of occasions. She also  admits to minor trauma to the hand. On serial x-rays, estimated 3 weeks  after the original surgery it was noted to be proximal migration of the  Arthrex compression screw. I did plan Exploration of the right ring  finger MP joint. There were several surgical options available from  advancement and further fixation of compression screw, removal of the  hardware, bone grafting, pin stabilization of the fracture fragments. These were all reviewed with the patient, we did obtain verbal informed  consent, while the patient was in the office. I did meet Sigifredo Willoughby in the preoperative holding room to again answer  questions and outline the operation. We did obtain written informed  consent for the surgery team and surgical center, while the patient was  in the preoperative holding room. The patient did receive a left upper  extremity IV. Placement per nursing and pre-operative antibiotics. The  patient was escorted back to the surgery suite, laid supine on the  operating table. The patient then underwent IV. Sedation per the  Anesthesia Team.  The operating room bed was rotated 90 degrees with  Anesthesia workbench and the right upper extremity was abducted 70  degrees with the patient's body and placed onto a padded arm table. We  did have non-sterile Webril and ATS tourniquet on the upper arm. The  tourniquet was inflated to 250 mmHg for a total of 42 minutes. Prior to anything invasive, as well as, after we were prepped and  draped, surgical team did perform two official time-outs. All members  were in accordance. I then provided the regional blocks, prior to the  prep. We did prep the right upper extremity with Betadine solution from  the fingertips down to the Webril. A sterile field was then created  using sterile sheets and sterile towels. I did place sterile fluff  towel underneath the hand and wrist for protection throughout the case.     I then identified anatomical landmarks, namely the previous S-shaped  incision overlying the right ring finger dorsal MP joint. I also  identified the skin Marcio lines, ring metacarpal bone, P1 bones. I  planned the same incision, although a little bit shorter proximally and  distally. I first tested the patient with a #15 blade scalpel, noting  adequate anesthesia. There was noted to be brisk bleeding from the skin  edges. I did pad an incision, as well as pad the extremity with a claw  stockinette followed by exsanguination of the 4-inch Esmarch. Returning to the surgery, I identified the previous sutures on extensor  brannon mechanism which were then taken down sharply with a Littler  scissors. I then identified the capsular sutures, which was similarly  taken down with a 6400 Chinik blade. I was then able to create the  capsular flaps radially and ulnarly to explore the joint. There was  noted to be mild to moderate damage from the compression screw on the  radial dorsal and mid articular surface on the metacarpal head. As  noted from the previous surgery, there was a small chunk of the distal  articular surface that fell apart with advancement of the compression  screw. I did use the same Arthrex compression screw set screwdriver to  see if I could engage the screw after removing a small amount of healing  bone. I was able to slowly advance the screw a quarter to a half turn  at a time so that there was 2 full screws advanced and now the screw was  flushed. No further advancement could be manually performed. Did not  want to break the hardware or cause any further fracture. There was  complete stability of the proximal fracture fragment and the distal  metacarpal shaft. Additional hardware was not needed. I did plan to  supplement the fracture site with DBM (demineralized bone matrix) graft  - there was no room for placement. I did copiously irrigate the wound  out with normal saline.   I did range of motion with extension and  flexion radial and ulnar deviation and circumduction noting no grinding  of the hardware on the joint spaces. I was content with the overall  advancement of the screw and exploration. There were no further signs  of complications. I then closed the wound in a similar way with the  capsular layer closed using interrupted horizontal mattress 4-0 Vicryl  suture. Closed the extensor brannon mechanism using interrupted horizontal  mattress, 4-0 Monocryl suture. I then closed the skin using  interrupted, horizontal mattress, 4-0 nylon suture. At the conclusion  of the case, the sponge, instrument, needle counts were correct x2. The  extremity was wiped clean of Betadine and blood. My standard soft  dressing on the digit, hand, and wrist followed by an ulnar gutter  4-inch plaster splint in a position of function, was then placed  followed by 4-inch Ace bandage. The patient tolerated the operation  well, was sent to the post anesthesia care unit in stable fair  condition, escorted by myself, RN, and CRNA.         Chantel Haque MD    D: 10/01/2022 10:26:44       T: 10/01/2022 10:32:06     [email or web address]  Job#: [id number]     Doc#: [id number]    CC:

## 2023-02-23 LAB — URINE CULTURE: NO GROWTH

## 2023-03-08 DIAGNOSIS — E11.9 TYPE 2 DIABETES MELLITUS WITHOUT COMPLICATION, WITHOUT LONG-TERM CURRENT USE OF INSULIN (MULTI): Primary | ICD-10-CM

## 2023-03-08 DIAGNOSIS — G47.00 INSOMNIA, UNSPECIFIED TYPE: ICD-10-CM

## 2023-03-08 PROBLEM — R60.0 BILATERAL LEG EDEMA: Status: ACTIVE | Noted: 2023-03-08

## 2023-03-08 PROBLEM — F39 MOOD DISORDER (CMS-HCC): Status: ACTIVE | Noted: 2023-03-08

## 2023-03-08 PROBLEM — E78.5 HYPERLIPIDEMIA: Status: ACTIVE | Noted: 2023-03-08

## 2023-03-08 PROBLEM — G47.33 OSA (OBSTRUCTIVE SLEEP APNEA): Status: ACTIVE | Noted: 2023-03-08

## 2023-03-08 PROBLEM — S92.325A CLOSED NONDISPLACED FRACTURE OF SECOND METATARSAL BONE OF LEFT FOOT: Status: ACTIVE | Noted: 2023-03-08

## 2023-03-08 PROBLEM — R20.0 NUMBNESS: Status: ACTIVE | Noted: 2023-03-08

## 2023-03-08 PROBLEM — K13.79 PAIN IN MOUTH: Status: ACTIVE | Noted: 2023-03-08

## 2023-03-08 PROBLEM — F11.90 OPIOID USE DISORDER: Status: ACTIVE | Noted: 2023-03-08

## 2023-03-08 PROBLEM — S92.335A CLOSED NONDISPLACED FRACTURE OF THIRD METATARSAL BONE OF LEFT FOOT: Status: ACTIVE | Noted: 2023-03-08

## 2023-03-08 PROBLEM — M79.672 LEFT FOOT PAIN: Status: ACTIVE | Noted: 2023-03-08

## 2023-03-08 PROBLEM — F32.A DEPRESSION: Status: ACTIVE | Noted: 2023-03-08

## 2023-03-08 PROBLEM — D64.9 ANEMIA: Status: ACTIVE | Noted: 2023-03-08

## 2023-03-08 PROBLEM — S92.345A CLOSED NONDISPLACED FRACTURE OF FOURTH METATARSAL BONE OF LEFT FOOT: Status: ACTIVE | Noted: 2023-03-08

## 2023-03-08 PROBLEM — R05.9 COUGH: Status: ACTIVE | Noted: 2023-03-08

## 2023-03-08 PROBLEM — J44.9 COPD (CHRONIC OBSTRUCTIVE PULMONARY DISEASE) (MULTI): Status: ACTIVE | Noted: 2023-03-08

## 2023-03-08 PROBLEM — S62.91XA HAND FRACTURE, RIGHT: Status: ACTIVE | Noted: 2023-03-08

## 2023-03-08 PROBLEM — S69.90XA FINGER INJURY: Status: ACTIVE | Noted: 2023-03-08

## 2023-03-08 PROBLEM — R06.02 SHORTNESS OF BREATH: Status: ACTIVE | Noted: 2023-03-08

## 2023-03-08 PROBLEM — E53.8 B12 DEFICIENCY: Status: ACTIVE | Noted: 2023-03-08

## 2023-03-08 PROBLEM — R05.8 PRODUCTIVE COUGH: Status: ACTIVE | Noted: 2023-03-08

## 2023-03-08 PROBLEM — S92.919A PHALANX FRACTURE, FOOT: Status: ACTIVE | Noted: 2023-03-08

## 2023-03-08 PROBLEM — M79.7 FIBROMYALGIA: Status: ACTIVE | Noted: 2023-03-08

## 2023-03-08 PROBLEM — R41.3 MEMORY LOSS: Status: ACTIVE | Noted: 2023-03-08

## 2023-03-08 PROBLEM — R26.89 ANTALGIC GAIT: Status: ACTIVE | Noted: 2023-03-08

## 2023-03-08 PROBLEM — M54.50 ACUTE BILATERAL LOW BACK PAIN WITHOUT SCIATICA: Status: ACTIVE | Noted: 2023-03-08

## 2023-03-08 PROBLEM — S62.604A: Status: ACTIVE | Noted: 2023-03-08

## 2023-03-08 PROBLEM — R07.89 CHEST PAIN, ATYPICAL: Status: ACTIVE | Noted: 2023-03-08

## 2023-03-08 PROBLEM — U07.1 COVID-19 VIRUS INFECTION: Status: ACTIVE | Noted: 2023-03-08

## 2023-03-08 PROBLEM — K12.1 ORAL ULCER: Status: ACTIVE | Noted: 2023-03-08

## 2023-03-08 PROBLEM — K12.2 ORAL INFECTION: Status: ACTIVE | Noted: 2023-03-08

## 2023-03-08 PROBLEM — S93.402A SPRAIN OF ANKLE, LEFT: Status: ACTIVE | Noted: 2023-03-08

## 2023-03-08 PROBLEM — N63.0 BREAST NODULE: Status: ACTIVE | Noted: 2023-03-08

## 2023-03-08 PROBLEM — R00.0 TACHYCARDIA: Status: ACTIVE | Noted: 2023-03-08

## 2023-03-08 PROBLEM — F17.210 CIGARETTE NICOTINE DEPENDENCE WITHOUT COMPLICATION: Status: ACTIVE | Noted: 2023-03-08

## 2023-03-08 PROBLEM — E11.40 DIABETIC NEUROPATHY (MULTI): Status: ACTIVE | Noted: 2023-03-08

## 2023-03-08 PROBLEM — C85.90 LYMPHOMA (MULTI): Status: ACTIVE | Noted: 2023-03-08

## 2023-03-08 RX ORDER — NYSTATIN 100000 U/G
OINTMENT TOPICAL
COMMUNITY
Start: 2022-09-09 | End: 2023-04-10 | Stop reason: ALTCHOICE

## 2023-03-08 RX ORDER — ACETAMINOPHEN 500 MG
TABLET ORAL
COMMUNITY
Start: 2021-08-25 | End: 2023-03-08 | Stop reason: SDUPTHER

## 2023-03-08 RX ORDER — VENLAFAXINE HYDROCHLORIDE 37.5 MG/1
CAPSULE, EXTENDED RELEASE ORAL
COMMUNITY
Start: 2022-06-07 | End: 2023-04-10 | Stop reason: ALTCHOICE

## 2023-03-08 RX ORDER — DESIPRAMINE HYDROCHLORIDE 50 MG/1
TABLET ORAL
COMMUNITY
Start: 2022-04-25 | End: 2023-04-10 | Stop reason: ALTCHOICE

## 2023-03-08 RX ORDER — BLOOD SUGAR DIAGNOSTIC
STRIP MISCELLANEOUS 2 TIMES DAILY
COMMUNITY
Start: 2022-08-03

## 2023-03-08 RX ORDER — BLOOD-GLUCOSE METER
KIT MISCELLANEOUS
COMMUNITY
Start: 2017-06-19 | End: 2023-10-20 | Stop reason: ALTCHOICE

## 2023-03-08 RX ORDER — TRAZODONE HYDROCHLORIDE 300 MG/1
1 TABLET ORAL NIGHTLY
COMMUNITY
Start: 2021-10-04 | End: 2023-04-10 | Stop reason: ALTCHOICE

## 2023-03-08 RX ORDER — ESCITALOPRAM OXALATE 20 MG/1
TABLET ORAL
COMMUNITY
Start: 2022-04-25 | End: 2023-04-10 | Stop reason: ALTCHOICE

## 2023-03-08 RX ORDER — LANOLIN ALCOHOL/MO/W.PET/CERES
1 CREAM (GRAM) TOPICAL DAILY
COMMUNITY
Start: 2022-08-03 | End: 2023-04-10 | Stop reason: ALTCHOICE

## 2023-03-08 RX ORDER — CALCIUM CARBONATE/VITAMIN D2 250 MG-125
TABLET ORAL
COMMUNITY
Start: 2022-04-25 | End: 2023-04-10 | Stop reason: ALTCHOICE

## 2023-03-08 RX ORDER — ONDANSETRON 4 MG/1
TABLET, ORALLY DISINTEGRATING ORAL
COMMUNITY
Start: 2022-08-01 | End: 2023-04-10 | Stop reason: ALTCHOICE

## 2023-03-08 RX ORDER — ALLOPURINOL 300 MG/1
TABLET ORAL
COMMUNITY
Start: 2022-04-25 | End: 2023-04-10 | Stop reason: ALTCHOICE

## 2023-03-08 RX ORDER — SENNOSIDES 8.6 MG
TABLET ORAL
COMMUNITY
Start: 2022-04-25 | End: 2023-05-01 | Stop reason: SDUPTHER

## 2023-03-08 RX ORDER — ATORVASTATIN CALCIUM 40 MG/1
TABLET, FILM COATED ORAL
COMMUNITY
Start: 2022-05-23 | End: 2023-04-10 | Stop reason: ALTCHOICE

## 2023-03-08 RX ORDER — PANTOPRAZOLE SODIUM 40 MG/1
1 TABLET, DELAYED RELEASE ORAL 2 TIMES DAILY
COMMUNITY
Start: 2023-02-15 | End: 2023-04-10 | Stop reason: ALTCHOICE

## 2023-03-08 RX ORDER — CLONIDINE HYDROCHLORIDE 0.1 MG/1
TABLET ORAL
COMMUNITY
Start: 2022-04-25 | End: 2023-10-20 | Stop reason: ALTCHOICE

## 2023-03-08 RX ORDER — HYDROCODONE BITARTRATE AND ACETAMINOPHEN 5; 325 MG/1; MG/1
1 TABLET ORAL EVERY 4 HOURS PRN
COMMUNITY
Start: 2022-08-25 | End: 2023-04-10 | Stop reason: ALTCHOICE

## 2023-03-08 RX ORDER — TRAZODONE HYDROCHLORIDE 100 MG/1
100 TABLET ORAL NIGHTLY
COMMUNITY
End: 2023-04-24

## 2023-03-08 RX ORDER — ACYCLOVIR 400 MG/1
TABLET ORAL
COMMUNITY
Start: 2022-07-07 | End: 2023-04-10 | Stop reason: ALTCHOICE

## 2023-03-08 RX ORDER — FERROUS SULFATE TAB 325 MG (65 MG ELEMENTAL FE) 325 (65 FE) MG
TAB ORAL
COMMUNITY
Start: 2022-04-25 | End: 2023-04-10 | Stop reason: ALTCHOICE

## 2023-03-08 RX ORDER — MULTIVITAMIN
TABLET ORAL
COMMUNITY
Start: 2020-11-06 | End: 2023-10-20 | Stop reason: SDUPTHER

## 2023-03-08 RX ORDER — MIRTAZAPINE 15 MG/1
1 TABLET, FILM COATED ORAL NIGHTLY
COMMUNITY
Start: 2022-10-26 | End: 2023-04-10 | Stop reason: ALTCHOICE

## 2023-03-08 RX ORDER — ALBUTEROL SULFATE 0.83 MG/ML
SOLUTION RESPIRATORY (INHALATION)
COMMUNITY
Start: 2022-01-30 | End: 2023-04-10 | Stop reason: ALTCHOICE

## 2023-03-08 RX ORDER — PROCHLORPERAZINE MALEATE 10 MG
TABLET ORAL
COMMUNITY
Start: 2022-03-11 | End: 2023-04-10 | Stop reason: ALTCHOICE

## 2023-03-08 RX ORDER — VENLAFAXINE HYDROCHLORIDE 75 MG/1
CAPSULE, EXTENDED RELEASE ORAL
COMMUNITY
Start: 2022-06-21 | End: 2023-04-10 | Stop reason: ALTCHOICE

## 2023-03-08 RX ORDER — NORETHINDRONE ACETATE AND ETHINYL ESTRADIOL 1; 5 MG/1; UG/1
1 TABLET ORAL DAILY
COMMUNITY
Start: 2023-02-22 | End: 2023-04-10 | Stop reason: ALTCHOICE

## 2023-03-08 RX ORDER — CHOLECALCIFEROL (VITAMIN D3) 50 MCG
1 TABLET ORAL DAILY
COMMUNITY
Start: 2021-08-25 | End: 2023-04-10 | Stop reason: ALTCHOICE

## 2023-03-08 RX ORDER — LANCETS 33 GAUGE
EACH MISCELLANEOUS
COMMUNITY
Start: 2023-02-24

## 2023-03-08 RX ORDER — FAMOTIDINE 20 MG/1
TABLET, FILM COATED ORAL
COMMUNITY
Start: 2022-04-25 | End: 2023-04-10 | Stop reason: ALTCHOICE

## 2023-03-08 RX ORDER — DEXTROMETHORPHAN POLISTIREX 30 MG/5ML
SUSPENSION ORAL
COMMUNITY
Start: 2022-04-25 | End: 2023-04-10 | Stop reason: ALTCHOICE

## 2023-03-08 RX ORDER — ALBUTEROL SULFATE 90 UG/1
AEROSOL, METERED RESPIRATORY (INHALATION)
COMMUNITY
Start: 2021-10-04

## 2023-03-08 RX ORDER — DIPHENHYDRAMINE HCL 25 MG
TABLET ORAL
COMMUNITY
End: 2023-10-20 | Stop reason: ALTCHOICE

## 2023-03-08 RX ORDER — ACETAMINOPHEN 500 MG
5 TABLET ORAL NIGHTLY
Qty: 90 TABLET | Refills: 0 | Status: SHIPPED | OUTPATIENT
Start: 2023-03-08 | End: 2023-03-09 | Stop reason: SDUPTHER

## 2023-03-08 RX ORDER — IBUPROFEN 800 MG/1
1 TABLET ORAL EVERY 6 HOURS PRN
COMMUNITY
Start: 2022-09-09

## 2023-03-08 RX ORDER — GABAPENTIN 600 MG/1
600 TABLET ORAL 3 TIMES DAILY
COMMUNITY
Start: 2018-02-13 | End: 2023-08-07 | Stop reason: SDUPTHER

## 2023-03-08 RX ORDER — GABAPENTIN 100 MG/1
CAPSULE ORAL
COMMUNITY
Start: 2022-09-01 | End: 2023-04-10 | Stop reason: ALTCHOICE

## 2023-03-08 RX ORDER — ONDANSETRON 4 MG/1
TABLET, FILM COATED ORAL
COMMUNITY
Start: 2022-03-11 | End: 2023-04-10 | Stop reason: ALTCHOICE

## 2023-03-09 DIAGNOSIS — G47.00 INSOMNIA, UNSPECIFIED TYPE: ICD-10-CM

## 2023-03-09 RX ORDER — ACETAMINOPHEN, DIPHENHYDRAMINE HCL, PHENYLEPHRINE HCL 325; 25; 5 MG/1; MG/1; MG/1
10 TABLET ORAL NIGHTLY
Qty: 90 TABLET | Refills: 1 | Status: SHIPPED | OUTPATIENT
Start: 2023-03-09 | End: 2023-05-01 | Stop reason: SDUPTHER

## 2023-04-10 ENCOUNTER — TELEMEDICINE (OUTPATIENT)
Dept: PRIMARY CARE | Facility: CLINIC | Age: 58
End: 2023-04-10
Payer: COMMERCIAL

## 2023-04-10 DIAGNOSIS — N39.0 URINARY TRACT INFECTION WITHOUT HEMATURIA, SITE UNSPECIFIED: Primary | ICD-10-CM

## 2023-04-10 PROCEDURE — 99212 OFFICE O/P EST SF 10 MIN: CPT | Performed by: INTERNAL MEDICINE

## 2023-04-10 RX ORDER — LEVOFLOXACIN 500 MG/1
500 TABLET, FILM COATED ORAL DAILY
Qty: 7 TABLET | Refills: 0 | Status: SHIPPED | OUTPATIENT
Start: 2023-04-10 | End: 2023-04-24 | Stop reason: ALTCHOICE

## 2023-04-10 RX ORDER — HYDROXYZINE PAMOATE 50 MG/1
CAPSULE ORAL
COMMUNITY
End: 2023-10-20 | Stop reason: ALTCHOICE

## 2023-04-10 RX ORDER — CLONAZEPAM 2 MG/1
TABLET ORAL
COMMUNITY
End: 2023-04-10 | Stop reason: ALTCHOICE

## 2023-04-10 RX ORDER — LANCETS 30 GAUGE
EACH MISCELLANEOUS
COMMUNITY
Start: 2023-02-19

## 2023-04-10 NOTE — PROGRESS NOTES
Subjective   Patient ID: Mame Keith is a 58 y.o. female who presents for UTI.    HPI   Reports dysuria x 2 days   +urinary urgency  Reports new low back pain   No hematuria   +Chills, no fever     Review of Systems   All other systems reviewed and are negative.      Objective   There were no vitals taken for this visit.    Physical Exam  Telephone visit  NAD, talking in  complete sentences   Assessment/Plan       Will rx levofloxacin 500mg x 7 days  for empiric treatment of UTI. (cover for pyelo with  chills and back pain )

## 2023-04-13 ENCOUNTER — OFFICE VISIT (OUTPATIENT)
Dept: PRIMARY CARE | Facility: CLINIC | Age: 58
End: 2023-04-13
Payer: COMMERCIAL

## 2023-04-13 VITALS
DIASTOLIC BLOOD PRESSURE: 50 MMHG | WEIGHT: 200 LBS | OXYGEN SATURATION: 97 % | HEART RATE: 74 BPM | SYSTOLIC BLOOD PRESSURE: 82 MMHG | BODY MASS INDEX: 30.63 KG/M2

## 2023-04-13 DIAGNOSIS — R31.9 HEMATURIA, UNSPECIFIED TYPE: ICD-10-CM

## 2023-04-13 DIAGNOSIS — E11.9 TYPE 2 DIABETES MELLITUS WITHOUT COMPLICATION, WITHOUT LONG-TERM CURRENT USE OF INSULIN (MULTI): Primary | ICD-10-CM

## 2023-04-13 DIAGNOSIS — R30.0 DYSURIA: ICD-10-CM

## 2023-04-13 DIAGNOSIS — G47.00 INSOMNIA, UNSPECIFIED TYPE: ICD-10-CM

## 2023-04-13 DIAGNOSIS — R21 RASH: ICD-10-CM

## 2023-04-13 LAB
POC APPEARANCE, URINE: ABNORMAL
POC BILIRUBIN, URINE: NEGATIVE
POC BLOOD, URINE: ABNORMAL
POC COLOR, URINE: YELLOW
POC GLUCOSE, URINE: NEGATIVE MG/DL
POC KETONES, URINE: NEGATIVE MG/DL
POC LEUKOCYTES, URINE: ABNORMAL
POC NITRITE,URINE: NEGATIVE
POC PH, URINE: 7 PH
POC PROTEIN, URINE: NEGATIVE MG/DL
POC SPECIFIC GRAVITY, URINE: 1.02
POC UROBILINOGEN, URINE: 0.2 EU/DL

## 2023-04-13 PROCEDURE — 3078F DIAST BP <80 MM HG: CPT | Performed by: INTERNAL MEDICINE

## 2023-04-13 PROCEDURE — 3008F BODY MASS INDEX DOCD: CPT | Performed by: INTERNAL MEDICINE

## 2023-04-13 PROCEDURE — 3074F SYST BP LT 130 MM HG: CPT | Performed by: INTERNAL MEDICINE

## 2023-04-13 PROCEDURE — 87086 URINE CULTURE/COLONY COUNT: CPT

## 2023-04-13 PROCEDURE — 1036F TOBACCO NON-USER: CPT | Performed by: INTERNAL MEDICINE

## 2023-04-13 PROCEDURE — 81001 URINALYSIS AUTO W/SCOPE: CPT

## 2023-04-13 PROCEDURE — 99214 OFFICE O/P EST MOD 30 MIN: CPT | Performed by: INTERNAL MEDICINE

## 2023-04-13 PROCEDURE — 81003 URINALYSIS AUTO W/O SCOPE: CPT | Performed by: INTERNAL MEDICINE

## 2023-04-13 RX ORDER — QUETIAPINE FUMARATE 50 MG/1
50-100 TABLET, FILM COATED ORAL NIGHTLY
Qty: 60 TABLET | Refills: 3 | Status: SHIPPED | OUTPATIENT
Start: 2023-04-13 | End: 2023-08-07 | Stop reason: ALTCHOICE

## 2023-04-13 RX ORDER — ATORVASTATIN CALCIUM 10 MG/1
10 TABLET, FILM COATED ORAL DAILY
Qty: 90 TABLET | Refills: 1 | Status: SHIPPED | OUTPATIENT
Start: 2023-04-13 | End: 2023-05-26 | Stop reason: ALTCHOICE

## 2023-04-13 RX ORDER — KETOCONAZOLE 20 MG/G
CREAM TOPICAL 2 TIMES DAILY
Qty: 60 G | Refills: 2 | Status: SHIPPED | OUTPATIENT
Start: 2023-04-13 | End: 2023-10-20 | Stop reason: ALTCHOICE

## 2023-04-13 ASSESSMENT — PATIENT HEALTH QUESTIONNAIRE - PHQ9
2. FEELING DOWN, DEPRESSED OR HOPELESS: NOT AT ALL
SUM OF ALL RESPONSES TO PHQ9 QUESTIONS 1 AND 2: 0
1. LITTLE INTEREST OR PLEASURE IN DOING THINGS: NOT AT ALL

## 2023-04-13 NOTE — PATIENT INSTRUCTIONS
Get fasting labs. Please complete fasting blood work. Fast for 10 hours, black coffee and water the morning of labs are OK.     Start seroquel for sleep, decrease trazodone to 100mg     Finish levofloxacin     Use ketoconazole cream on feet    Come back in 6 months

## 2023-04-13 NOTE — PROGRESS NOTES
Subjective   Patient ID: Mame Keith is a 58 y.o. female who presents for Follow-up, UTI, and Foot Pain.    HPI   Overall doing well   Sober 22 months, using Sublocade   Continues to report dysuria, No urgency /frequency. Chills and flank pain resolved   Reports burning and itching of b/l feet. No rash or wounds.     Review of Systems   All other systems reviewed and are negative.      Objective   BP 82/50   Pulse 74   Wt 90.7 kg (200 lb)   SpO2 97%   BMI 30.63 kg/m²     Physical Exam  Constitutional:       Appearance: Normal appearance.   HENT:      Head: Normocephalic and atraumatic.   Cardiovascular:      Rate and Rhythm: Normal rate and regular rhythm.      Heart sounds: Normal heart sounds. No murmur heard.     No gallop.   Pulmonary:      Effort: Pulmonary effort is normal. No respiratory distress.      Breath sounds: No wheezing or rales.   Skin:     General: Skin is warm and dry.      Findings: No rash.   Neurological:      Mental Status: She is alert and oriented to person, place, and time. Mental status is at baseline.   Psychiatric:         Mood and Affect: Mood normal.         Behavior: Behavior normal.         Assessment/Plan       #DM2  -Check A1c today, Cont Januvia    #HLD  -Restart atorvastatin 10mg daily, order lipid panel     #UTI  -Sx improving. Send UA for Cx. Cont levofloxacin      #Lymphoma   -s/p R-[facility], following with Dr. Harrington at [facility]      #Depression   -Mood is stable. Not currently on meds     #Insomnia  -not well controlled. Start Seroquel 50-100mg at bedtime and decrease trazodone to 100mg nightly     #Itching feet  -?peripheral neuropathy vs funal infection.   -Start ketoconazole cream BID  -Cont pantera      #Neuropathy, back pain   -Refill gabapentin 600mg TID     #OUD  -Following at [facility]. Cont Sublocade

## 2023-04-14 LAB
APPEARANCE, URINE: ABNORMAL
BILIRUBIN, URINE: NEGATIVE
BLOOD, URINE: NEGATIVE
COLOR, URINE: YELLOW
GLUCOSE, URINE: NEGATIVE MG/DL
KETONES, URINE: NEGATIVE MG/DL
LEUKOCYTE ESTERASE, URINE: ABNORMAL
MUCUS, URINE: ABNORMAL /LPF
NITRITE, URINE: NEGATIVE
PH, URINE: 7 (ref 5–8)
PROTEIN, URINE: NEGATIVE MG/DL
RBC, URINE: 2 /HPF (ref 0–5)
SPECIFIC GRAVITY, URINE: 1.02 (ref 1–1.03)
SQUAMOUS EPITHELIAL CELLS, URINE: 14 /HPF
TRANSITIONAL EPITHELIAL CELLS, URINE: <1 /HPF
UROBILINOGEN, URINE: <2 MG/DL (ref 0–1.9)
WBC, URINE: 6 /HPF (ref 0–5)

## 2023-04-15 LAB — URINE CULTURE: NORMAL

## 2023-04-20 ENCOUNTER — TELEMEDICINE (OUTPATIENT)
Dept: PRIMARY CARE | Facility: CLINIC | Age: 58
End: 2023-04-20
Payer: COMMERCIAL

## 2023-04-20 DIAGNOSIS — F17.290 OTHER TOBACCO PRODUCT NICOTINE DEPENDENCE, UNCOMPLICATED: Primary | ICD-10-CM

## 2023-04-20 PROCEDURE — 99442 PR PHYS/QHP TELEPHONE EVALUATION 11-20 MIN: CPT | Performed by: INTERNAL MEDICINE

## 2023-04-20 RX ORDER — BUPROPION HYDROCHLORIDE 150 MG/1
150 TABLET ORAL EVERY MORNING
Qty: 90 TABLET | Refills: 1 | Status: SHIPPED | OUTPATIENT
Start: 2023-04-20 | End: 2023-08-07

## 2023-04-20 NOTE — PROGRESS NOTES
Subjective   Patient ID: Mame Keith is a 58 y.o. female who presents for Follow-up. Patient requesting RX for sleep and smoking     HPI   Patient is interested in quitting vaping. She has vaped for 18 mo, she smoked 20 packs years prior to that. She has tried nicotine patches in the past and was unsuccessful.     Review of Systems   All other systems reviewed and are negative.      Objective   There were no vitals taken for this visit.    Physical Exam  NAD, Talking in complete sentences     Assessment/Plan     #Nicotine dependence   -Rx bupropion XL 150mg daily

## 2023-04-26 DIAGNOSIS — K59.03 DRUG-INDUCED CONSTIPATION: Primary | ICD-10-CM

## 2023-04-26 DIAGNOSIS — G47.00 INSOMNIA, UNSPECIFIED TYPE: ICD-10-CM

## 2023-04-26 DIAGNOSIS — G47.00 INSOMNIA, UNSPECIFIED: ICD-10-CM

## 2023-04-27 RX ORDER — TRAZODONE HYDROCHLORIDE 300 MG/1
TABLET ORAL
Qty: 90 TABLET | Refills: 1 | OUTPATIENT
Start: 2023-04-27

## 2023-05-01 RX ORDER — SENNOSIDES 8.6 MG/1
TABLET ORAL
Qty: 90 TABLET | Refills: 1 | Status: SHIPPED | OUTPATIENT
Start: 2023-05-01

## 2023-05-01 RX ORDER — ACETAMINOPHEN, DIPHENHYDRAMINE HCL, PHENYLEPHRINE HCL 325; 25; 5 MG/1; MG/1; MG/1
10 TABLET ORAL NIGHTLY
Qty: 90 TABLET | Refills: 1 | Status: SHIPPED | OUTPATIENT
Start: 2023-05-01 | End: 2023-08-07 | Stop reason: SDUPTHER

## 2023-05-01 NOTE — TELEPHONE ENCOUNTER
Requested Prescriptions     Pending Prescriptions Disp Refills    sennosides (senna) 8.6 mg tablet 90 tablet 0    melatonin 10 mg tablet 90 tablet 1     Sig: Take 1 tablet (10 mg) by mouth once daily at bedtime.     Refused Prescriptions Disp Refills    traZODone (Desyrel) 300 mg tablet [Pharmacy Med Name: TRAZODONE 300 MG TABLET] 90 tablet 1     Sig: TAKE 1 TABLET BY MOUTH EVERYDAY AT BEDTIME     Refused By: BORWN SHERMAN     Reason for Refusal: Refill not appropriate

## 2023-05-24 ENCOUNTER — APPOINTMENT (OUTPATIENT)
Dept: PRIMARY CARE | Facility: CLINIC | Age: 58
End: 2023-05-24
Payer: COMMERCIAL

## 2023-05-26 DIAGNOSIS — E11.9 TYPE 2 DIABETES MELLITUS WITHOUT COMPLICATION, WITHOUT LONG-TERM CURRENT USE OF INSULIN (MULTI): ICD-10-CM

## 2023-05-26 DIAGNOSIS — E78.5 HYPERLIPIDEMIA, UNSPECIFIED HYPERLIPIDEMIA TYPE: Primary | ICD-10-CM

## 2023-05-26 RX ORDER — ATORVASTATIN CALCIUM 20 MG/1
20 TABLET, FILM COATED ORAL DAILY
Qty: 90 TABLET | Refills: 1 | Status: SHIPPED | OUTPATIENT
Start: 2023-05-26 | End: 2023-08-07 | Stop reason: SDUPTHER

## 2023-05-30 RX ORDER — SITAGLIPTIN 100 MG/1
TABLET, FILM COATED ORAL
Qty: 90 TABLET | Refills: 1 | Status: SHIPPED | OUTPATIENT
Start: 2023-05-30 | End: 2024-02-27 | Stop reason: SDUPTHER

## 2023-06-19 DIAGNOSIS — G47.00 INSOMNIA, UNSPECIFIED: ICD-10-CM

## 2023-06-19 RX ORDER — TRAZODONE HYDROCHLORIDE 100 MG/1
100 TABLET ORAL NIGHTLY PRN
Qty: 90 TABLET | Refills: 1 | Status: SHIPPED | OUTPATIENT
Start: 2023-06-19 | End: 2023-08-07 | Stop reason: SDUPTHER

## 2023-06-19 NOTE — TELEPHONE ENCOUNTER
Requested Prescriptions     Pending Prescriptions Disp Refills    traZODone (Desyrel) 100 mg tablet 90 tablet 1     Sig: Take 1 tablet (100 mg) by mouth as needed at bedtime for sleep.

## 2023-08-07 ENCOUNTER — TELEMEDICINE (OUTPATIENT)
Dept: PRIMARY CARE | Facility: CLINIC | Age: 58
End: 2023-08-07
Payer: COMMERCIAL

## 2023-08-07 DIAGNOSIS — E78.5 HYPERLIPIDEMIA, UNSPECIFIED HYPERLIPIDEMIA TYPE: ICD-10-CM

## 2023-08-07 DIAGNOSIS — K21.9 GASTROESOPHAGEAL REFLUX DISEASE, UNSPECIFIED WHETHER ESOPHAGITIS PRESENT: ICD-10-CM

## 2023-08-07 DIAGNOSIS — M54.50 ACUTE BILATERAL LOW BACK PAIN WITHOUT SCIATICA: ICD-10-CM

## 2023-08-07 DIAGNOSIS — M79.7 FIBROMYALGIA: ICD-10-CM

## 2023-08-07 DIAGNOSIS — G47.00 INSOMNIA, UNSPECIFIED TYPE: ICD-10-CM

## 2023-08-07 DIAGNOSIS — F17.210 CIGARETTE NICOTINE DEPENDENCE WITHOUT COMPLICATION: Primary | ICD-10-CM

## 2023-08-07 DIAGNOSIS — G47.00 INSOMNIA, UNSPECIFIED: ICD-10-CM

## 2023-08-07 PROCEDURE — 99442 PR PHYS/QHP TELEPHONE EVALUATION 11-20 MIN: CPT | Performed by: INTERNAL MEDICINE

## 2023-08-07 RX ORDER — PANTOPRAZOLE SODIUM 40 MG/1
40 TABLET, DELAYED RELEASE ORAL 2 TIMES DAILY
Qty: 90 TABLET | Refills: 1 | Status: SHIPPED | OUTPATIENT
Start: 2023-08-07

## 2023-08-07 RX ORDER — TRAZODONE HYDROCHLORIDE 100 MG/1
100 TABLET ORAL NIGHTLY PRN
Qty: 90 TABLET | Refills: 1 | Status: SHIPPED | OUTPATIENT
Start: 2023-08-07 | End: 2024-02-05 | Stop reason: SDUPTHER

## 2023-08-07 RX ORDER — BUPROPION HYDROCHLORIDE 300 MG/1
300 TABLET ORAL EVERY MORNING
Qty: 90 TABLET | Refills: 1 | Status: SHIPPED | OUTPATIENT
Start: 2023-08-07 | End: 2024-02-03

## 2023-08-07 RX ORDER — PANTOPRAZOLE SODIUM 40 MG/1
40 TABLET, DELAYED RELEASE ORAL 2 TIMES DAILY
COMMUNITY
End: 2023-08-07 | Stop reason: SDUPTHER

## 2023-08-07 RX ORDER — ATORVASTATIN CALCIUM 20 MG/1
20 TABLET, FILM COATED ORAL DAILY
Qty: 90 TABLET | Refills: 1 | Status: SHIPPED | OUTPATIENT
Start: 2023-08-07 | End: 2024-02-08 | Stop reason: SDUPTHER

## 2023-08-07 RX ORDER — GABAPENTIN 600 MG/1
600 TABLET ORAL 3 TIMES DAILY
Qty: 270 TABLET | Refills: 1 | Status: SHIPPED | OUTPATIENT
Start: 2023-08-07

## 2023-08-07 RX ORDER — ACETAMINOPHEN, DIPHENHYDRAMINE HCL, PHENYLEPHRINE HCL 325; 25; 5 MG/1; MG/1; MG/1
10 TABLET ORAL NIGHTLY
Qty: 90 TABLET | Refills: 1 | Status: SHIPPED | OUTPATIENT
Start: 2023-08-07

## 2023-08-07 NOTE — PROGRESS NOTES
Subjective   Patient ID: Mame Keith is a 58 y.o. female who presents for Med Refill. Up Wellbutrin decrease Seroquel     HPI   Requesting to wean off Seroquel is sleeping well.   She still vaping daily, feels like wellbutrin is not working. Mood has been good. Appetite is good.     Review of Systems   All other systems reviewed and are negative.      Objective   There were no vitals taken for this visit.    Physical Exam  NAD  Talks in complete sentences  Mood and affect are appropriate       Assessment/Plan       #Insomnia  -Ok to stop seroquel     #Nicotine dependence  -Increase Wellbutrin XL 300mg daily

## 2023-10-20 ENCOUNTER — TELEMEDICINE (OUTPATIENT)
Dept: PRIMARY CARE | Facility: CLINIC | Age: 58
End: 2023-10-20
Payer: COMMERCIAL

## 2023-10-20 DIAGNOSIS — E11.9 TYPE 2 DIABETES MELLITUS WITHOUT COMPLICATION, WITHOUT LONG-TERM CURRENT USE OF INSULIN (MULTI): ICD-10-CM

## 2023-10-20 DIAGNOSIS — N39.0 URINARY TRACT INFECTION WITHOUT HEMATURIA, SITE UNSPECIFIED: Primary | ICD-10-CM

## 2023-10-20 PROCEDURE — 99442 PR PHYS/QHP TELEPHONE EVALUATION 11-20 MIN: CPT | Performed by: INTERNAL MEDICINE

## 2023-10-20 RX ORDER — MULTIVITAMIN
1 TABLET ORAL DAILY
Qty: 90 TABLET | Refills: 1 | Status: SHIPPED | OUTPATIENT
Start: 2023-10-20

## 2023-10-20 RX ORDER — NITROFURANTOIN 25; 75 MG/1; MG/1
100 CAPSULE ORAL 2 TIMES DAILY
Qty: 10 CAPSULE | Refills: 0 | Status: SHIPPED | OUTPATIENT
Start: 2023-10-20 | End: 2023-10-25

## 2023-10-20 NOTE — PROGRESS NOTES
Subjective   Patient ID: Mame Keith is a 58 y.o. female who presents for UTI.    HPI     Reports dysuria, increased urinary urency and frequency x 1 day. No fever, chills or new back pain     Review of Systems   All other systems reviewed and are negative.      Objective   There were no vitals taken for this visit.    Physical Exam    NAD  Talks in complete sentences  Mood and affect are appropriate       Assessment/Plan       #Dysuria   -Rx Macrobid BID x 5 days for empiric tx of UTI

## 2023-11-28 ENCOUNTER — OFFICE VISIT (OUTPATIENT)
Dept: PRIMARY CARE | Facility: CLINIC | Age: 58
End: 2023-11-28
Payer: COMMERCIAL

## 2023-11-28 VITALS
HEIGHT: 67 IN | SYSTOLIC BLOOD PRESSURE: 111 MMHG | BODY MASS INDEX: 32.96 KG/M2 | WEIGHT: 210 LBS | DIASTOLIC BLOOD PRESSURE: 78 MMHG | HEART RATE: 64 BPM

## 2023-11-28 DIAGNOSIS — N30.01 ACUTE CYSTITIS WITH HEMATURIA: Primary | ICD-10-CM

## 2023-11-28 DIAGNOSIS — R30.0 DYSURIA: ICD-10-CM

## 2023-11-28 PROBLEM — R22.31 LOCALIZED SWELLING OF RIGHT UPPER EXTREMITY: Status: ACTIVE | Noted: 2022-03-24

## 2023-11-28 PROBLEM — E55.9 VITAMIN D DEFICIENCY: Status: ACTIVE | Noted: 2018-10-23

## 2023-11-28 PROBLEM — D61.810 PANCYTOPENIA DUE TO ANTINEOPLASTIC CHEMOTHERAPY (CMS-HCC): Status: ACTIVE | Noted: 2022-01-28

## 2023-11-28 PROBLEM — C82.90 FOLLICULAR LYMPHOMA (MULTI): Status: ACTIVE | Noted: 2022-01-28

## 2023-11-28 PROBLEM — D70.9 FEBRILE NEUTROPENIA (CMS-HCC): Status: ACTIVE | Noted: 2022-01-28

## 2023-11-28 PROBLEM — G62.9 PERIPHERAL NEUROPATHY: Status: ACTIVE | Noted: 2018-10-23

## 2023-11-28 PROBLEM — R73.9 STEROID-INDUCED HYPERGLYCEMIA: Status: ACTIVE | Noted: 2022-01-29

## 2023-11-28 PROBLEM — F11.20 OPIATE DEPENDENCE (MULTI): Status: ACTIVE | Noted: 2022-01-28

## 2023-11-28 PROBLEM — R50.9 FEVER: Status: ACTIVE | Noted: 2022-02-20

## 2023-11-28 PROBLEM — R53.81 PHYSICAL DECONDITIONING: Status: ACTIVE | Noted: 2022-03-24

## 2023-11-28 PROBLEM — E66.9 CLASS 1 OBESITY: Status: ACTIVE | Noted: 2022-01-29

## 2023-11-28 PROBLEM — G62.9 POLYNEUROPATHY: Status: ACTIVE | Noted: 2023-11-28

## 2023-11-28 PROBLEM — N95.1 POSTMENOPAUSAL DISORDER: Status: ACTIVE | Noted: 2023-11-28

## 2023-11-28 PROBLEM — T38.0X5A STEROID-INDUCED HYPERGLYCEMIA: Status: ACTIVE | Noted: 2022-01-29

## 2023-11-28 PROBLEM — F43.10 PTSD (POST-TRAUMATIC STRESS DISORDER): Status: ACTIVE | Noted: 2023-11-28

## 2023-11-28 PROBLEM — F41.9 ANXIETY: Status: ACTIVE | Noted: 2023-11-28

## 2023-11-28 PROBLEM — R09.81 NASAL CONGESTION: Status: ACTIVE | Noted: 2022-03-24

## 2023-11-28 PROBLEM — I95.9 HYPOTENSION: Status: ACTIVE | Noted: 2022-03-23

## 2023-11-28 PROBLEM — R50.81 FEBRILE NEUTROPENIA (CMS-HCC): Status: ACTIVE | Noted: 2022-01-28

## 2023-11-28 PROBLEM — K21.9 GASTROESOPHAGEAL REFLUX DISEASE: Status: ACTIVE | Noted: 2023-11-28

## 2023-11-28 PROBLEM — B18.2 CHRONIC HEPATITIS C (MULTI): Status: ACTIVE | Noted: 2023-11-28

## 2023-11-28 PROBLEM — F19.10 SUBSTANCE ABUSE (MULTI): Status: ACTIVE | Noted: 2022-02-21

## 2023-11-28 PROBLEM — S92.902A CLOSED FRACTURE OF BONE OF LEFT FOOT: Status: ACTIVE | Noted: 2022-02-21

## 2023-11-28 PROBLEM — R54 ADVANCED AGE: Status: ACTIVE | Noted: 2023-11-28

## 2023-11-28 PROBLEM — T45.1X5A PANCYTOPENIA DUE TO ANTINEOPLASTIC CHEMOTHERAPY (CMS-HCC): Status: ACTIVE | Noted: 2022-01-28

## 2023-11-28 LAB
APPEARANCE UR: ABNORMAL
BACTERIA #/AREA URNS AUTO: ABNORMAL /HPF
BILIRUB UR STRIP.AUTO-MCNC: NEGATIVE MG/DL
COLOR UR: YELLOW
GLUCOSE UR STRIP.AUTO-MCNC: NEGATIVE MG/DL
KETONES UR STRIP.AUTO-MCNC: NEGATIVE MG/DL
LEUKOCYTE ESTERASE UR QL STRIP.AUTO: ABNORMAL
MUCOUS THREADS #/AREA URNS AUTO: ABNORMAL /LPF
NITRITE UR QL STRIP.AUTO: NEGATIVE
PH UR STRIP.AUTO: 7 [PH]
POC APPEARANCE, URINE: ABNORMAL
POC BILIRUBIN, URINE: NEGATIVE
POC BLOOD, URINE: ABNORMAL
POC COLOR, URINE: YELLOW
POC GLUCOSE, URINE: NEGATIVE MG/DL
POC KETONES, URINE: NEGATIVE MG/DL
POC LEUKOCYTES, URINE: ABNORMAL
POC NITRITE,URINE: NEGATIVE
POC PH, URINE: 7 PH
POC PROTEIN, URINE: NEGATIVE MG/DL
POC SPECIFIC GRAVITY, URINE: 1.01
POC UROBILINOGEN, URINE: 0.2 EU/DL
PROT UR STRIP.AUTO-MCNC: NEGATIVE MG/DL
RBC # UR STRIP.AUTO: ABNORMAL /UL
RBC #/AREA URNS AUTO: ABNORMAL /HPF
SP GR UR STRIP.AUTO: 1.01
SQUAMOUS #/AREA URNS AUTO: ABNORMAL /HPF
UROBILINOGEN UR STRIP.AUTO-MCNC: <2 MG/DL
WBC #/AREA URNS AUTO: >50 /HPF
WBC CLUMPS #/AREA URNS AUTO: ABNORMAL /HPF

## 2023-11-28 PROCEDURE — 3074F SYST BP LT 130 MM HG: CPT | Performed by: INTERNAL MEDICINE

## 2023-11-28 PROCEDURE — 3008F BODY MASS INDEX DOCD: CPT | Performed by: INTERNAL MEDICINE

## 2023-11-28 PROCEDURE — 87086 URINE CULTURE/COLONY COUNT: CPT

## 2023-11-28 PROCEDURE — 87186 SC STD MICRODIL/AGAR DIL: CPT

## 2023-11-28 PROCEDURE — 81003 URINALYSIS AUTO W/O SCOPE: CPT | Performed by: INTERNAL MEDICINE

## 2023-11-28 PROCEDURE — 3078F DIAST BP <80 MM HG: CPT | Performed by: INTERNAL MEDICINE

## 2023-11-28 PROCEDURE — 99214 OFFICE O/P EST MOD 30 MIN: CPT | Performed by: INTERNAL MEDICINE

## 2023-11-28 PROCEDURE — 81001 URINALYSIS AUTO W/SCOPE: CPT

## 2023-11-28 PROCEDURE — 1036F TOBACCO NON-USER: CPT | Performed by: INTERNAL MEDICINE

## 2023-11-28 RX ORDER — NITROFURANTOIN 25; 75 MG/1; MG/1
100 CAPSULE ORAL 2 TIMES DAILY
Qty: 14 CAPSULE | Refills: 0 | Status: SHIPPED | OUTPATIENT
Start: 2023-11-28 | End: 2023-12-05

## 2023-11-28 RX ORDER — QUETIAPINE FUMARATE 25 MG/1
1 TABLET, FILM COATED ORAL NIGHTLY
COMMUNITY
Start: 2023-05-24 | End: 2024-02-08 | Stop reason: SDUPTHER

## 2023-11-28 NOTE — PROGRESS NOTES
Subjective   Patient ID: Mame Keith is a 58 y.o. female who presents for UTI.    HPI     Review of Systems    Objective   There were no vitals taken for this visit.    Physical Exam    Assessment/Plan

## 2023-11-28 NOTE — PROGRESS NOTES
"Subjective   Patient ID: Mame Keith is a 58 y.o. female who presents for UTI.    Uti symptoms starting yesterday  Chills,   Having urinary frequency and gets bladder spasm  Burning a little all the time, worse when urinating,   After she empty has the bladder spasms        UTI          Review of Systems    Objective   /78   Pulse 64   Ht 1.702 m (5' 7\")   Wt 95.3 kg (210 lb)   BMI 32.89 kg/m²     Physical Exam  Constitutional:       Appearance: Normal appearance.   Abdominal:      Tenderness: There is no right CVA tenderness or left CVA tenderness.   Neurological:      Mental Status: She is alert.         Assessment/Plan          Patient Instructions   Uti with hematuria  Take macrobid twice daily for 7 days  Call if rash or diarrhea  Will call with culture results   "

## 2023-11-28 NOTE — PATIENT INSTRUCTIONS
Uti with hematuria  Take macrobid twice daily for 7 days  Call if rash or diarrhea  Will call with culture results

## 2023-12-01 LAB — BACTERIA UR CULT: ABNORMAL

## 2023-12-26 ENCOUNTER — DOCUMENTATION (OUTPATIENT)
Dept: PRIMARY CARE | Facility: CLINIC | Age: 58
End: 2023-12-26
Payer: COMMERCIAL

## 2023-12-26 LAB — HEMOGLOBIN A1C/HEMOGLOBIN TOTAL IN BLOOD EXTERNAL: 5.9 %

## 2024-02-05 ENCOUNTER — LAB (OUTPATIENT)
Dept: LAB | Facility: LAB | Age: 59
End: 2024-02-05
Payer: COMMERCIAL

## 2024-02-05 ENCOUNTER — OFFICE VISIT (OUTPATIENT)
Dept: PRIMARY CARE | Facility: CLINIC | Age: 59
End: 2024-02-05
Payer: COMMERCIAL

## 2024-02-05 VITALS
BODY MASS INDEX: 31.71 KG/M2 | HEIGHT: 67 IN | DIASTOLIC BLOOD PRESSURE: 67 MMHG | WEIGHT: 202 LBS | HEART RATE: 71 BPM | SYSTOLIC BLOOD PRESSURE: 106 MMHG

## 2024-02-05 DIAGNOSIS — R41.3 MEMORY LOSS: ICD-10-CM

## 2024-02-05 DIAGNOSIS — E78.5 HYPERLIPIDEMIA, UNSPECIFIED HYPERLIPIDEMIA TYPE: ICD-10-CM

## 2024-02-05 DIAGNOSIS — F41.9 ANXIETY: ICD-10-CM

## 2024-02-05 DIAGNOSIS — Z12.31 ENCOUNTER FOR SCREENING MAMMOGRAM FOR MALIGNANT NEOPLASM OF BREAST: ICD-10-CM

## 2024-02-05 DIAGNOSIS — E11.9 TYPE 2 DIABETES MELLITUS WITHOUT COMPLICATION, WITHOUT LONG-TERM CURRENT USE OF INSULIN (MULTI): Primary | ICD-10-CM

## 2024-02-05 DIAGNOSIS — G47.00 INSOMNIA, UNSPECIFIED: ICD-10-CM

## 2024-02-05 DIAGNOSIS — R21 RASH: ICD-10-CM

## 2024-02-05 DIAGNOSIS — E11.9 TYPE 2 DIABETES MELLITUS WITHOUT COMPLICATION, WITHOUT LONG-TERM CURRENT USE OF INSULIN (MULTI): ICD-10-CM

## 2024-02-05 PROBLEM — L03.031 PARONYCHIA OF TOE OF RIGHT FOOT: Status: ACTIVE | Noted: 2023-12-27

## 2024-02-05 PROBLEM — L60.0 INGROWN TOENAIL: Status: ACTIVE | Noted: 2023-12-27

## 2024-02-05 PROBLEM — M76.821 POSTERIOR TIBIAL TENDINITIS, RIGHT: Status: ACTIVE | Noted: 2023-12-27

## 2024-02-05 PROBLEM — M77.32 HEEL SPUR, LEFT: Status: ACTIVE | Noted: 2023-12-27

## 2024-02-05 PROBLEM — M21.6X2 EQUINUS DEFORMITY OF LEFT FOOT: Status: ACTIVE | Noted: 2023-12-27

## 2024-02-05 PROBLEM — M77.31 HEEL SPUR, RIGHT: Status: ACTIVE | Noted: 2023-12-27

## 2024-02-05 PROBLEM — M21.6X1 EQUINUS DEFORMITY OF RIGHT FOOT: Status: ACTIVE | Noted: 2023-12-27

## 2024-02-05 PROBLEM — M72.2 PLANTAR FASCIITIS: Status: ACTIVE | Noted: 2023-12-27

## 2024-02-05 PROBLEM — L30.8 OTHER SPECIFIED DERMATITIS: Status: ACTIVE | Noted: 2024-01-03

## 2024-02-05 PROBLEM — M76.822 POSTERIOR TIBIAL TENDONITIS, LEFT: Status: ACTIVE | Noted: 2023-12-27

## 2024-02-05 PROBLEM — M65.9 SYNOVITIS AND TENOSYNOVITIS: Status: ACTIVE | Noted: 2023-12-27

## 2024-02-05 PROBLEM — L03.032 PARONYCHIA OF TOE OF LEFT FOOT: Status: ACTIVE | Noted: 2023-12-27

## 2024-02-05 LAB
CHOLEST SERPL-MCNC: 211 MG/DL (ref 0–199)
CHOLESTEROL/HDL RATIO: 6
HDLC SERPL-MCNC: 35.3 MG/DL
LDLC SERPL CALC-MCNC: 131 MG/DL
NON HDL CHOLESTEROL: 176 MG/DL (ref 0–149)
TRIGL SERPL-MCNC: 225 MG/DL (ref 0–149)
VLDL: 45 MG/DL (ref 0–40)

## 2024-02-05 PROCEDURE — 82043 UR ALBUMIN QUANTITATIVE: CPT

## 2024-02-05 PROCEDURE — 99214 OFFICE O/P EST MOD 30 MIN: CPT | Performed by: INTERNAL MEDICINE

## 2024-02-05 PROCEDURE — 82570 ASSAY OF URINE CREATININE: CPT

## 2024-02-05 PROCEDURE — 3008F BODY MASS INDEX DOCD: CPT | Performed by: INTERNAL MEDICINE

## 2024-02-05 PROCEDURE — 36415 COLL VENOUS BLD VENIPUNCTURE: CPT

## 2024-02-05 PROCEDURE — 80061 LIPID PANEL: CPT

## 2024-02-05 PROCEDURE — 82607 VITAMIN B-12: CPT

## 2024-02-05 PROCEDURE — 83036 HEMOGLOBIN GLYCOSYLATED A1C: CPT

## 2024-02-05 PROCEDURE — 3078F DIAST BP <80 MM HG: CPT | Performed by: INTERNAL MEDICINE

## 2024-02-05 PROCEDURE — 1036F TOBACCO NON-USER: CPT | Performed by: INTERNAL MEDICINE

## 2024-02-05 PROCEDURE — 3074F SYST BP LT 130 MM HG: CPT | Performed by: INTERNAL MEDICINE

## 2024-02-05 RX ORDER — TRAZODONE HYDROCHLORIDE 50 MG/1
50 TABLET ORAL NIGHTLY PRN
Qty: 90 TABLET | Refills: 1 | Status: SHIPPED | OUTPATIENT
Start: 2024-02-05 | End: 2024-05-06 | Stop reason: ALTCHOICE

## 2024-02-05 RX ORDER — ESCITALOPRAM OXALATE 5 MG/1
5 TABLET ORAL DAILY
Qty: 30 TABLET | Refills: 1 | Status: SHIPPED | OUTPATIENT
Start: 2024-02-05 | End: 2024-05-05

## 2024-02-05 RX ORDER — METHYLPREDNISOLONE 4 MG/1
TABLET ORAL
Qty: 21 TABLET | Refills: 0 | Status: SHIPPED | OUTPATIENT
Start: 2024-02-05 | End: 2024-02-12

## 2024-02-05 NOTE — PATIENT INSTRUCTIONS
Medrol pack for rash  Start escitalopram for anxiety, take trazodone 50mg daily and seroquel 25mg daily   Get fasting labs   Number on form for neuropsych    [facility]  [address]  [address] [place], [place] [address]  Phone: [phone]  M-F  7:30-5  Sat  8-12     Come back in 6 months

## 2024-02-05 NOTE — PROGRESS NOTES
Subjective   Patient ID: Mame Keith is a 58 y.o. female who presents for 6 month follow up and Rash.    HPI     Reports itchy rash on her left shift and right forearm. Noticed it 2.5 months. Used steroid cream which was not helpful. No fever or chills    Still having memory issues, was seen  by neurology and recommended neuropsych    Having trouble staying asleep, wakes up frequently. Has a lot of anxiety. No panic attacks     Review of Systems   All other systems reviewed and are negative.      Objective   There were no vitals taken for this visit.    Physical Exam  Constitutional:       Appearance: Normal appearance.   HENT:      Head: Normocephalic and atraumatic.   Cardiovascular:      Rate and Rhythm: Normal rate and regular rhythm.      Heart sounds: Normal heart sounds. No murmur heard.     No gallop.   Pulmonary:      Effort: Pulmonary effort is normal. No respiratory distress.      Breath sounds: No wheezing or rales.   Skin:     General: Skin is warm and dry.      Findings: No rash.   Neurological:      Mental Status: She is alert and oriented to person, place, and time. Mental status is at baseline.   Psychiatric:         Mood and Affect: Mood normal.         Behavior: Behavior normal.         Assessment/Plan       #DM2  -Check A1c today, not taking Januvia      #HLD  -Not taking statin , check lipid panel      #Lymphoma   -s/p R-CHOP, following with Dr. Harrington at [facility]      #Insomnia, anxiety  -decrease trazodone to 50mg daily, cont seroquel 25mg daily       -Start escitalopram 5mg daily      #Neuropathy, back pain   -Cont  gabapentin 600mg TID     #OUD  -Following at [facility]. Cont Sublocade     #Rash  -Rx Medrol pack

## 2024-02-06 LAB
CREAT UR-MCNC: 144.4 MG/DL (ref 20–320)
EST. AVERAGE GLUCOSE BLD GHB EST-MCNC: 126 MG/DL
HBA1C MFR BLD: 6 %
MICROALBUMIN UR-MCNC: <7 MG/L
MICROALBUMIN/CREAT UR: NORMAL MG/G{CREAT}
VIT B12 SERPL-MCNC: 381 PG/ML (ref 211–911)

## 2024-02-08 DIAGNOSIS — G47.00 INSOMNIA, UNSPECIFIED TYPE: ICD-10-CM

## 2024-02-08 DIAGNOSIS — E11.9 TYPE 2 DIABETES MELLITUS WITHOUT COMPLICATION, WITHOUT LONG-TERM CURRENT USE OF INSULIN (MULTI): Primary | ICD-10-CM

## 2024-02-08 DIAGNOSIS — E78.5 HYPERLIPIDEMIA, UNSPECIFIED HYPERLIPIDEMIA TYPE: ICD-10-CM

## 2024-02-08 RX ORDER — QUETIAPINE FUMARATE 25 MG/1
25 TABLET, FILM COATED ORAL NIGHTLY
Qty: 90 TABLET | Refills: 1 | Status: SHIPPED | OUTPATIENT
Start: 2024-02-08

## 2024-02-08 RX ORDER — ATORVASTATIN CALCIUM 20 MG/1
20 TABLET, FILM COATED ORAL DAILY
Qty: 90 TABLET | Refills: 1 | Status: SHIPPED | OUTPATIENT
Start: 2024-02-08 | End: 2024-08-06

## 2024-02-08 RX ORDER — SEMAGLUTIDE 0.68 MG/ML
INJECTION, SOLUTION SUBCUTANEOUS
Qty: 3 ML | Refills: 0 | Status: SHIPPED | OUTPATIENT
Start: 2024-02-08 | End: 2024-03-21

## 2024-02-27 DIAGNOSIS — E11.9 TYPE 2 DIABETES MELLITUS WITHOUT COMPLICATION, WITHOUT LONG-TERM CURRENT USE OF INSULIN (MULTI): ICD-10-CM

## 2024-03-03 DIAGNOSIS — E11.9 TYPE 2 DIABETES MELLITUS WITHOUT COMPLICATION, WITHOUT LONG-TERM CURRENT USE OF INSULIN (MULTI): ICD-10-CM

## 2024-03-06 RX ORDER — SITAGLIPTIN 100 MG/1
100 TABLET, FILM COATED ORAL DAILY
Qty: 90 TABLET | Refills: 1 | Status: SHIPPED | OUTPATIENT
Start: 2024-03-06 | End: 2024-05-06

## 2024-03-06 NOTE — TELEPHONE ENCOUNTER
Requested Prescriptions     Pending Prescriptions Disp Refills    Januvia 100 mg tablet [Pharmacy Med Name: JANUVIA 100 MG TABLET] 90 tablet 1     Sig: take 1 tablet by mouth once daily

## 2024-04-07 ENCOUNTER — HOSPITAL ENCOUNTER (EMERGENCY)
Age: 59
Discharge: HOME | End: 2024-04-07
Attending: EMERGENCY MEDICINE
Payer: COMMERCIAL

## 2024-04-07 ENCOUNTER — APPOINTMENT (OUTPATIENT)
Dept: CT IMAGING | Age: 59
End: 2024-04-07
Payer: COMMERCIAL

## 2024-04-07 ENCOUNTER — APPOINTMENT (OUTPATIENT)
Dept: GENERAL RADIOLOGY | Age: 59
End: 2024-04-07
Payer: COMMERCIAL

## 2024-04-07 VITALS
HEIGHT: 67 IN | SYSTOLIC BLOOD PRESSURE: 129 MMHG | RESPIRATION RATE: 16 BRPM | TEMPERATURE: 99.5 F | WEIGHT: 205 LBS | BODY MASS INDEX: 32.18 KG/M2 | DIASTOLIC BLOOD PRESSURE: 84 MMHG | OXYGEN SATURATION: 99 % | HEART RATE: 74 BPM

## 2024-04-07 DIAGNOSIS — J06.9 ACUTE UPPER RESPIRATORY INFECTION: Primary | ICD-10-CM

## 2024-04-07 DIAGNOSIS — J18.9 PNEUMONIA DUE TO INFECTIOUS ORGANISM, UNSPECIFIED LATERALITY, UNSPECIFIED PART OF LUNG: ICD-10-CM

## 2024-04-07 DIAGNOSIS — R16.1 SPLENOMEGALY: ICD-10-CM

## 2024-04-07 LAB
ALBUMIN SERPL-MCNC: 3.7 G/DL (ref 3.5–5.2)
ALP SERPL-CCNC: 104 U/L (ref 35–104)
ALT SERPL-CCNC: 14 U/L (ref 0–32)
ANION GAP SERPL CALCULATED.3IONS-SCNC: 9 MMOL/L (ref 7–16)
AST SERPL-CCNC: 16 U/L (ref 0–31)
BASOPHILS # BLD: 0.02 K/UL (ref 0–0.2)
BASOPHILS NFR BLD: 0 % (ref 0–2)
BILIRUB SERPL-MCNC: <0.2 MG/DL (ref 0–1.2)
BILIRUB UR QL STRIP: NEGATIVE
BUN SERPL-MCNC: 14 MG/DL (ref 6–20)
CALCIUM SERPL-MCNC: 8.5 MG/DL (ref 8.6–10.2)
CHLORIDE SERPL-SCNC: 102 MMOL/L (ref 98–107)
CLARITY UR: CLEAR
CO2 SERPL-SCNC: 28 MMOL/L (ref 22–29)
COLOR UR: YELLOW
CREAT SERPL-MCNC: 0.8 MG/DL (ref 0.5–1)
D DIMER: 211 NG/ML DDU (ref 0–232)
EOSINOPHIL # BLD: 0.05 K/UL (ref 0.05–0.5)
EOSINOPHILS RELATIVE PERCENT: 1 % (ref 0–6)
ERYTHROCYTE [DISTWIDTH] IN BLOOD BY AUTOMATED COUNT: 12.9 % (ref 11.5–15)
GFR SERPL CREATININE-BSD FRML MDRD: 81 ML/MIN/1.73M2
GLUCOSE SERPL-MCNC: 92 MG/DL (ref 74–99)
GLUCOSE UR STRIP-MCNC: NEGATIVE MG/DL
HCT VFR BLD AUTO: 37 % (ref 34–48)
HGB BLD-MCNC: 11.9 G/DL (ref 11.5–15.5)
HGB UR QL STRIP.AUTO: NEGATIVE
IMM GRANULOCYTES # BLD AUTO: <0.03 K/UL (ref 0–0.58)
IMM GRANULOCYTES NFR BLD: 0 % (ref 0–5)
INFLUENZA A BY PCR: NOT DETECTED
INFLUENZA B BY PCR: NOT DETECTED
INR PPP: 1.1
KETONES UR STRIP-MCNC: NEGATIVE MG/DL
LACTATE BLDV-SCNC: 0.6 MMOL/L (ref 0.5–2.2)
LEUKOCYTE ESTERASE UR QL STRIP: NEGATIVE
LIPASE SERPL-CCNC: 15 U/L (ref 13–60)
LYMPHOCYTES NFR BLD: 0.88 K/UL (ref 1.5–4)
LYMPHOCYTES RELATIVE PERCENT: 19 % (ref 20–42)
MCH RBC QN AUTO: 28.9 PG (ref 26–35)
MCHC RBC AUTO-ENTMCNC: 32.2 G/DL (ref 32–34.5)
MCV RBC AUTO: 89.8 FL (ref 80–99.9)
MONOCYTES NFR BLD: 0.51 K/UL (ref 0.1–0.95)
MONOCYTES NFR BLD: 11 % (ref 2–12)
NEUTROPHILS NFR BLD: 69 % (ref 43–80)
NEUTS SEG NFR BLD: 3.27 K/UL (ref 1.8–7.3)
NITRITE UR QL STRIP: NEGATIVE
PH UR STRIP: 5.5 [PH] (ref 5–9)
PLATELET # BLD AUTO: 153 K/UL (ref 130–450)
PMV BLD AUTO: 9.9 FL (ref 7–12)
POTASSIUM SERPL-SCNC: 4.1 MMOL/L (ref 3.5–5)
PROT SERPL-MCNC: 6.3 G/DL (ref 6.4–8.3)
PROT UR STRIP-MCNC: NEGATIVE MG/DL
PROTHROMBIN TIME: 12.1 SEC (ref 9.3–12.4)
RBC # BLD AUTO: 4.12 M/UL (ref 3.5–5.5)
RBC #/AREA URNS HPF: ABNORMAL /HPF
RSV BY PCR: NOT DETECTED
SARS-COV-2 RDRP RESP QL NAA+PROBE: NOT DETECTED
SODIUM SERPL-SCNC: 139 MMOL/L (ref 132–146)
SP GR UR STRIP: >1.03 (ref 1–1.03)
SPECIMEN DESCRIPTION: NORMAL
SPECIMEN SOURCE: NORMAL
UROBILINOGEN UR STRIP-ACNC: 0.2 EU/DL (ref 0–1)
WBC #/AREA URNS HPF: ABNORMAL /HPF
WBC OTHER # BLD: 4.8 K/UL (ref 4.5–11.5)

## 2024-04-07 PROCEDURE — 83690 ASSAY OF LIPASE: CPT

## 2024-04-07 PROCEDURE — 87635 SARS-COV-2 COVID-19 AMP PRB: CPT

## 2024-04-07 PROCEDURE — 99285 EMERGENCY DEPT VISIT HI MDM: CPT

## 2024-04-07 PROCEDURE — 6360000004 HC RX CONTRAST MEDICATION: Performed by: RADIOLOGY

## 2024-04-07 PROCEDURE — 2580000003 HC RX 258: Performed by: EMERGENCY MEDICINE

## 2024-04-07 PROCEDURE — 80053 COMPREHEN METABOLIC PANEL: CPT

## 2024-04-07 PROCEDURE — 6370000000 HC RX 637 (ALT 250 FOR IP): Performed by: EMERGENCY MEDICINE

## 2024-04-07 PROCEDURE — 87040 BLOOD CULTURE FOR BACTERIA: CPT

## 2024-04-07 PROCEDURE — 87634 RSV DNA/RNA AMP PROBE: CPT

## 2024-04-07 PROCEDURE — 83605 ASSAY OF LACTIC ACID: CPT

## 2024-04-07 PROCEDURE — 87502 INFLUENZA DNA AMP PROBE: CPT

## 2024-04-07 PROCEDURE — 71046 X-RAY EXAM CHEST 2 VIEWS: CPT

## 2024-04-07 PROCEDURE — 81001 URINALYSIS AUTO W/SCOPE: CPT

## 2024-04-07 PROCEDURE — 85025 COMPLETE CBC W/AUTO DIFF WBC: CPT

## 2024-04-07 PROCEDURE — 85610 PROTHROMBIN TIME: CPT

## 2024-04-07 PROCEDURE — 85379 FIBRIN DEGRADATION QUANT: CPT

## 2024-04-07 PROCEDURE — 71275 CT ANGIOGRAPHY CHEST: CPT

## 2024-04-07 RX ORDER — CEFDINIR 300 MG/1
300 CAPSULE ORAL ONCE
Status: COMPLETED | OUTPATIENT
Start: 2024-04-07 | End: 2024-04-07

## 2024-04-07 RX ORDER — DOXYCYCLINE HYCLATE 100 MG/1
100 CAPSULE ORAL ONCE
Status: COMPLETED | OUTPATIENT
Start: 2024-04-07 | End: 2024-04-07

## 2024-04-07 RX ORDER — CEFDINIR 300 MG/1
300 CAPSULE ORAL 2 TIMES DAILY
Qty: 14 CAPSULE | Refills: 0 | Status: SHIPPED | OUTPATIENT
Start: 2024-04-07 | End: 2024-04-14

## 2024-04-07 RX ORDER — DOXYCYCLINE HYCLATE 100 MG
100 TABLET ORAL 2 TIMES DAILY
Qty: 14 TABLET | Refills: 0 | Status: SHIPPED | OUTPATIENT
Start: 2024-04-07 | End: 2024-04-14

## 2024-04-07 RX ORDER — 0.9 % SODIUM CHLORIDE 0.9 %
1000 INTRAVENOUS SOLUTION INTRAVENOUS ONCE
Status: COMPLETED | OUTPATIENT
Start: 2024-04-07 | End: 2024-04-07

## 2024-04-07 RX ADMIN — SODIUM CHLORIDE 1000 ML: 9 INJECTION, SOLUTION INTRAVENOUS at 18:03

## 2024-04-07 RX ADMIN — IOPAMIDOL 75 ML: 755 INJECTION, SOLUTION INTRAVENOUS at 21:03

## 2024-04-07 RX ADMIN — CEFDINIR 300 MG: 300 CAPSULE ORAL at 21:59

## 2024-04-07 RX ADMIN — DOXYCYCLINE HYCLATE 100 MG: 100 CAPSULE ORAL at 21:59

## 2024-04-07 ASSESSMENT — LIFESTYLE VARIABLES
HOW MANY STANDARD DRINKS CONTAINING ALCOHOL DO YOU HAVE ON A TYPICAL DAY: PATIENT DOES NOT DRINK
HOW OFTEN DO YOU HAVE A DRINK CONTAINING ALCOHOL: NEVER

## 2024-04-07 ASSESSMENT — PAIN DESCRIPTION - LOCATION: LOCATION: HEAD

## 2024-04-07 ASSESSMENT — PAIN - FUNCTIONAL ASSESSMENT: PAIN_FUNCTIONAL_ASSESSMENT: 0-10

## 2024-04-07 ASSESSMENT — PAIN SCALES - GENERAL: PAINLEVEL_OUTOF10: 6

## 2024-04-07 NOTE — ED PROVIDER NOTES
Department of Emergency Medicine  FIRST PROVIDER TRIAGE NOTE             Independent MLP           4/7/24  4:45 PM EDT    Date of Encounter: 4/7/24   MRN: [id number]      HPI: Brenda Beaulieu is a 59 y.o. female who presents to the ED for Shortness of Breath (Cough, sinus congestion.  Sent by ) and Fatigue  Sent in by urgent care for shortness of breath, cough and sinus congestion.  Concern for pneumonia.  Patient has history of non-Hodgkin's lymphoma.  Has not been taking any medications for symptoms.    ROS: Negative for cp.    PE: Gen Appearance/Constitutional: alert  CV: regular rate     Initial Plan of Care: All treatment areas with department are currently occupied. Plan to order/Initiate the following while awaiting opening in ED: labs, EKG, and imaging studies.  Initiate Treatment-Testing, Proceed toTreatment Area When Bed Available for ED Attending/MLP to Continue Care    Electronically signed by LOGAN Maldonado CNP   DD: 4/7/24       Tirso Dao APRN - CNP  04/07/24 [id number]

## 2024-04-07 NOTE — ED PROVIDER NOTES
[facility] EMERGENCY DEPARTMENT  EMERGENCY DEPARTMENT ENCOUNTER      Pt Name: Brenda Beaulieu  MRN: [id number]  Birthdate 1965  Date of evaluation: 4/7/2024  Provider: Rene Escobedo DO  PCP: William Key DO  Note Started: 6:55 PM EDT 4/7/24    CHIEF COMPLAINT       Chief Complaint   Patient presents with    Shortness of Breath     Cough, sinus congestion.  Sent by     Fatigue       HISTORY OF PRESENT ILLNESS: 1 or more Elements   History From: Patient  Limitations to history : None    Brenda Beaulieu is a 59 y.o. female who presents to the ED for evaluation of a cough.  Patient states that for the past 5 days or so she has been having a cough as well as some sinus congestion and fatigue.  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  Patient states that she continues to have a cough and continues to feel rundown does not have any improvement which caused her some concern.  Patient states she is concerned because she has a previous history of lymphoma.  Patient remarks that she was seen in the local urgent care and advised to come to the ER for further evaluation.  Patient does endorse some shortness of breath but denies any chest pain.    Nursing Notes were all reviewed and agreed with or any disagreements were addressed in the HPI.    REVIEW OF SYSTEMS :    Positives and Pertinent negatives as per HPI.     SURGICAL HISTORY     Past Surgical History:   Procedure Laterality Date    AXILLARY SURGERY Right 05/06/2021    EXCISIONAL BIOPSY RIGHT AXILLARY LYMPH NODE performed by Michoacano Jacobson MD at [facility] OR    BLADDER REPAIR      bladder suspension w mesh  & anter/post repair od vagina    FINGER SURGERY Right 9/21/2022    RIGHT RING FINGER P1 METACARPOPHALANGEAL JOINT EXPLORATION (ARTHREX, COMPRESSION SCREW SET, REGULAR C-ARM, CANCELLOUS BONE GRAFT, BONE WAx) (cpt 71672) performed by Jeff France MD at [facility] OR    HAND

## 2024-04-13 LAB
MICROORGANISM SPEC CULT: NORMAL
MICROORGANISM SPEC CULT: NORMAL
SERVICE CMNT-IMP: NORMAL
SERVICE CMNT-IMP: NORMAL
SPECIMEN DESCRIPTION: NORMAL
SPECIMEN DESCRIPTION: NORMAL

## 2024-05-03 ENCOUNTER — TELEPHONE (OUTPATIENT)
Dept: PRIMARY CARE | Facility: CLINIC | Age: 59
End: 2024-05-03
Payer: COMMERCIAL

## 2024-05-06 DIAGNOSIS — E11.9 TYPE 2 DIABETES MELLITUS WITHOUT COMPLICATION, WITHOUT LONG-TERM CURRENT USE OF INSULIN (MULTI): ICD-10-CM

## 2024-05-06 DIAGNOSIS — G47.00 INSOMNIA, UNSPECIFIED TYPE: Primary | ICD-10-CM

## 2024-05-06 RX ORDER — TRAZODONE HYDROCHLORIDE 100 MG/1
100 TABLET ORAL NIGHTLY PRN
Qty: 90 TABLET | Refills: 2 | Status: SHIPPED | OUTPATIENT
Start: 2024-05-06

## 2024-05-06 RX ORDER — METFORMIN HYDROCHLORIDE 500 MG/1
500 TABLET ORAL
Qty: 180 TABLET | Refills: 0 | Status: SHIPPED | OUTPATIENT
Start: 2024-05-06 | End: 2025-06-10

## 2024-05-06 NOTE — TELEPHONE ENCOUNTER
Requested Prescriptions     Pending Prescriptions Disp Refills    traZODone (Desyrel) 100 mg tablet [Pharmacy Med Name: TRAZODONE 100 MG TABLET] 90 tablet 2     Sig: take 1 tablet by mouth at bedtime if needed for sleep    metFORMIN (Glucophage) 500 mg tablet 180 tablet 0     Sig: Take 1 tablet (500 mg) by mouth 2 times a day with meals.

## 2024-08-05 ENCOUNTER — APPOINTMENT (OUTPATIENT)
Dept: PRIMARY CARE | Facility: CLINIC | Age: 59
End: 2024-08-05
Payer: COMMERCIAL

## 2024-08-05 DIAGNOSIS — M79.7 FIBROMYALGIA: ICD-10-CM

## 2024-08-05 DIAGNOSIS — E11.9 TYPE 2 DIABETES MELLITUS WITHOUT COMPLICATION, WITHOUT LONG-TERM CURRENT USE OF INSULIN (MULTI): ICD-10-CM

## 2024-08-05 DIAGNOSIS — F41.9 ANXIETY: Primary | ICD-10-CM

## 2024-08-05 DIAGNOSIS — G47.00 INSOMNIA, UNSPECIFIED TYPE: ICD-10-CM

## 2024-08-05 DIAGNOSIS — E78.5 HYPERLIPIDEMIA, UNSPECIFIED HYPERLIPIDEMIA TYPE: ICD-10-CM

## 2024-08-05 PROCEDURE — 3062F POS MACROALBUMINURIA REV: CPT | Performed by: INTERNAL MEDICINE

## 2024-08-05 PROCEDURE — 99214 OFFICE O/P EST MOD 30 MIN: CPT | Performed by: INTERNAL MEDICINE

## 2024-08-05 PROCEDURE — 3044F HG A1C LEVEL LT 7.0%: CPT | Performed by: INTERNAL MEDICINE

## 2024-08-05 PROCEDURE — 1036F TOBACCO NON-USER: CPT | Performed by: INTERNAL MEDICINE

## 2024-08-05 PROCEDURE — 3050F LDL-C >= 130 MG/DL: CPT | Performed by: INTERNAL MEDICINE

## 2024-08-05 RX ORDER — HYDROXYZINE HYDROCHLORIDE 25 MG/1
25 TABLET, FILM COATED ORAL 4 TIMES DAILY PRN
Qty: 90 TABLET | Refills: 1 | Status: SHIPPED | OUTPATIENT
Start: 2024-08-05 | End: 2024-09-19

## 2024-08-05 RX ORDER — TRAZODONE HYDROCHLORIDE 50 MG/1
50 TABLET ORAL NIGHTLY PRN
Qty: 90 TABLET | Refills: 3 | Status: SHIPPED | OUTPATIENT
Start: 2024-08-05

## 2024-08-05 RX ORDER — GABAPENTIN 600 MG/1
600 TABLET ORAL 3 TIMES DAILY
Qty: 270 TABLET | Refills: 3 | Status: SHIPPED | OUTPATIENT
Start: 2024-08-05

## 2024-08-05 RX ORDER — ATORVASTATIN CALCIUM 20 MG/1
20 TABLET, FILM COATED ORAL DAILY
Qty: 90 TABLET | Refills: 3 | Status: SHIPPED | OUTPATIENT
Start: 2024-08-05 | End: 2025-07-31

## 2024-08-05 NOTE — PROGRESS NOTES
Subjective   Patient ID: Mame Keith is a 59 y.o. female who presents for Follow-up (6 month follow up).    HPI     Reports worsening anxiety recently. 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    Review of Systems    Objective   There were no vitals taken for this visit.    Physical Exam    Assessment/Plan       #DM2  -Cont metformin 500mg BID , check A1c     #HLD  -Cont atorvastatin 20mg daily, check lipids     #Lymphoma   -s/p R-CHOP, following with Dr. Harrington at [facility]      #Insomnia, anxiety  -Cont trazodone and Seroquel 25mg daily       -Start hydroxyzine 25mg QID prn      #Neuropathy, back pain   -Cont  gabapentin 600mg TID     #OUD  -Following at [facility]. Cont Sublocade      Mamm: per gyn   [place] 3/1/23

## (undated) DEVICE — TUBING, SUCTION, 1/4" X 10', STRAIGHT: Brand: MEDLINE

## (undated) DEVICE — PAD,NON-ADHERENT,3X8,STERILE,LF,1/PK: Brand: MEDLINE

## (undated) DEVICE — E-Z CLEAN, NON-STICK, PTFE COATED, ELECTROSURGICAL BLADE ELECTRODE, 2.5 INCH (6.35 CM): Brand: EZ CLEAN

## (undated) DEVICE — GLOVE ORANGE PI 7 1/2   MSG9075

## (undated) DEVICE — GOWN SURG XL SMS FAB NONREINFORCED RAGLAN SLV HK LOOP CLSR

## (undated) DEVICE — GAUZE,SPONGE,4"X4",16PLY,STRL,LF,10/TRAY: Brand: MEDLINE

## (undated) DEVICE — NDL CNTR 40CT FM MAG: Brand: MEDLINE INDUSTRIES, INC.

## (undated) DEVICE — ELECTRODE PT RET AD L9FT HI MOIST COND ADH HYDRGEL CORDED

## (undated) DEVICE — SOLUTION SCRB 32OZ 7.5% POVIDONE IOD BTL GENTLE EFFECTIVE

## (undated) DEVICE — INTENDED FOR TISSUE SEPARATION, AND OTHER PROCEDURES THAT REQUIRE A SHARP SURGICAL BLADE TO PUNCTURE OR CUT.: Brand: BARD-PARKER ® STAINLESS STEEL BLADES

## (undated) DEVICE — COVER,LIGHT HANDLE,FLX,1/PK: Brand: MEDLINE INDUSTRIES, INC.

## (undated) DEVICE — GLOVE ORANGE PI 8 1/2   MSG9085

## (undated) DEVICE — SYRINGE IRRIG 60ML SFT PLIABLE BLB EZ TO GRP 1 HND USE W/

## (undated) DEVICE — BLADE ES ELASTOMERIC COAT INSUL DURABLE BEND UPTO 90DEG

## (undated) DEVICE — HANDLE CVR PATENTED RETENTION DISC STRL LIGHT SHLD

## (undated) DEVICE — BANDAGE,GAUZE,BULKEE II,4.5"X4.1YD,STRL: Brand: MEDLINE

## (undated) DEVICE — GLOVE ORANGE PI 8   MSG9080

## (undated) DEVICE — 20 ML SYRINGE REGULAR TIP: Brand: MONOJECT

## (undated) DEVICE — BANDAGE COMPR W4INXL5YD WHT BGE POLY COT M E WRP WV HK AND

## (undated) DEVICE — MARKER,SKIN,WI/RULER AND LABELS: Brand: MEDLINE

## (undated) DEVICE — SOLUTION IV IRRIG POUR BRL 0.9% SODIUM CHL 2F7124

## (undated) DEVICE — PENCIL ES L3M BTTN SWCH HOLSTER W/ BLDE ELECTRD EDGE

## (undated) DEVICE — DOUBLE BASIN SET: Brand: MEDLINE INDUSTRIES, INC.

## (undated) DEVICE — YANKAUER,BULB TIP,W/O VENT,RIGID,STERILE: Brand: MEDLINE

## (undated) DEVICE — GOWN,SIRUS,NONRNF,SETINSLV,XL,20/CS: Brand: MEDLINE

## (undated) DEVICE — SET SURG INSTR DISSECT

## (undated) DEVICE — GAUZE,SPONGE,4"X4",16PLY,XRAY,STRL,LF: Brand: MEDLINE

## (undated) DEVICE — CUFF TOURNIQUET 18 SNG BLADDER DUAL PORT

## (undated) DEVICE — NEEDLE HYPO 25GA L1.5IN BLU POLYPR HUB S STL REG BVL STR

## (undated) DEVICE — STOCKINETTE COMPR W4XL54IN 2 PLY COT

## (undated) DEVICE — APPLICATOR MEDICATED 26 CC SOLUTION HI LT ORNG CHLORAPREP

## (undated) DEVICE — TIBURON EXTREMITY SHEET: Brand: CONVERTORS

## (undated) DEVICE — PEN: MARKING STD 100/CS: Brand: MEDICAL ACTION INDUSTRIES

## (undated) DEVICE — 1810 FOAM BLOCK NEEDLE COUNTER: Brand: DEVON

## (undated) DEVICE — NEPTUNE E-SEP SMOKE EVACUATION PENCIL, COATED, 70MM BLADE, PUSH BUTTON SWITCH: Brand: NEPTUNE E-SEP

## (undated) DEVICE — SYRINGE MED 10ML TRNSLUC BRL PLUNG BLK MRK POLYPR CTRL

## (undated) DEVICE — PAD N ADH W3XL4IN POLY COT SFT PERF FLM EASILY CUT ABSRB

## (undated) DEVICE — TOWEL OR BLUEE 16X26IN ST 8 PACK ORB08 16X26ORTWL

## (undated) DEVICE — TOWEL,OR,DSP,ST,BLUE,STD,6/PK,12PK/CS: Brand: MEDLINE

## (undated) DEVICE — BASIC PACK: Brand: CONVERTORS

## (undated) DEVICE — NEEDLE HYPO 18GA L1.5IN PNK POLYPR HUB S STL THN WALL FILL

## (undated) DEVICE — 12 ML SYRINGE,LUER-LOCK TIP: Brand: MONOJECT

## (undated) DEVICE — CONTROL SYRINGE LUER-LOCK TIP: Brand: MONOJECT